# Patient Record
Sex: MALE | Race: WHITE | NOT HISPANIC OR LATINO | Employment: OTHER | URBAN - METROPOLITAN AREA
[De-identification: names, ages, dates, MRNs, and addresses within clinical notes are randomized per-mention and may not be internally consistent; named-entity substitution may affect disease eponyms.]

---

## 2017-04-24 ENCOUNTER — HOSPITAL ENCOUNTER (OUTPATIENT)
Dept: LAB | Facility: MEDICAL CENTER | Age: 57
End: 2017-04-24
Attending: FAMILY MEDICINE
Payer: COMMERCIAL

## 2017-04-24 LAB
ALBUMIN SERPL BCP-MCNC: 4.8 G/DL (ref 3.2–4.9)
ALBUMIN/GLOB SERPL: 1.9 G/DL
ALP SERPL-CCNC: 63 U/L (ref 30–99)
ALT SERPL-CCNC: 33 U/L (ref 2–50)
ANION GAP SERPL CALC-SCNC: 7 MMOL/L (ref 0–11.9)
AST SERPL-CCNC: 23 U/L (ref 12–45)
BASOPHILS # BLD AUTO: 0.8 % (ref 0–1.8)
BASOPHILS # BLD: 0.04 K/UL (ref 0–0.12)
BILIRUB SERPL-MCNC: 1.3 MG/DL (ref 0.1–1.5)
BUN SERPL-MCNC: 14 MG/DL (ref 8–22)
CALCIUM SERPL-MCNC: 9.4 MG/DL (ref 8.5–10.5)
CHLORIDE SERPL-SCNC: 100 MMOL/L (ref 96–112)
CHOLEST SERPL-MCNC: 186 MG/DL (ref 100–199)
CO2 SERPL-SCNC: 30 MMOL/L (ref 20–33)
CREAT SERPL-MCNC: 0.83 MG/DL (ref 0.5–1.4)
EOSINOPHIL # BLD AUTO: 0.08 K/UL (ref 0–0.51)
EOSINOPHIL NFR BLD: 1.6 % (ref 0–6.9)
ERYTHROCYTE [DISTWIDTH] IN BLOOD BY AUTOMATED COUNT: 42.5 FL (ref 35.9–50)
EST. AVERAGE GLUCOSE BLD GHB EST-MCNC: 108 MG/DL
GFR SERPL CREATININE-BSD FRML MDRD: >60 ML/MIN/1.73 M 2
GLOBULIN SER CALC-MCNC: 2.5 G/DL (ref 1.9–3.5)
GLUCOSE SERPL-MCNC: 97 MG/DL (ref 65–99)
HBA1C MFR BLD: 5.4 % (ref 0–5.6)
HCT VFR BLD AUTO: 48 % (ref 42–52)
HDLC SERPL-MCNC: 43 MG/DL
HGB BLD-MCNC: 16.4 G/DL (ref 14–18)
IMM GRANULOCYTES # BLD AUTO: 0.01 K/UL (ref 0–0.11)
IMM GRANULOCYTES NFR BLD AUTO: 0.2 % (ref 0–0.9)
LDLC SERPL CALC-MCNC: 124 MG/DL
LYMPHOCYTES # BLD AUTO: 1.2 K/UL (ref 1–4.8)
LYMPHOCYTES NFR BLD: 23.7 % (ref 22–41)
MCH RBC QN AUTO: 31.1 PG (ref 27–33)
MCHC RBC AUTO-ENTMCNC: 34.2 G/DL (ref 33.7–35.3)
MCV RBC AUTO: 90.9 FL (ref 81.4–97.8)
MONOCYTES # BLD AUTO: 0.43 K/UL (ref 0–0.85)
MONOCYTES NFR BLD AUTO: 8.5 % (ref 0–13.4)
NEUTROPHILS # BLD AUTO: 3.31 K/UL (ref 1.82–7.42)
NEUTROPHILS NFR BLD: 65.2 % (ref 44–72)
NRBC # BLD AUTO: 0 K/UL
NRBC BLD AUTO-RTO: 0 /100 WBC
PLATELET # BLD AUTO: 184 K/UL (ref 164–446)
PMV BLD AUTO: 10.5 FL (ref 9–12.9)
POTASSIUM SERPL-SCNC: 3.5 MMOL/L (ref 3.6–5.5)
PROT SERPL-MCNC: 7.3 G/DL (ref 6–8.2)
PSA SERPL-MCNC: 1.74 NG/ML (ref 0–4)
RBC # BLD AUTO: 5.28 M/UL (ref 4.7–6.1)
SODIUM SERPL-SCNC: 137 MMOL/L (ref 135–145)
TRIGL SERPL-MCNC: 97 MG/DL (ref 0–149)
WBC # BLD AUTO: 5.1 K/UL (ref 4.8–10.8)

## 2017-04-24 PROCEDURE — 83036 HEMOGLOBIN GLYCOSYLATED A1C: CPT

## 2017-04-24 PROCEDURE — 84153 ASSAY OF PSA TOTAL: CPT

## 2017-04-24 PROCEDURE — 80061 LIPID PANEL: CPT

## 2017-04-24 PROCEDURE — 36415 COLL VENOUS BLD VENIPUNCTURE: CPT

## 2017-04-24 PROCEDURE — 80053 COMPREHEN METABOLIC PANEL: CPT

## 2017-04-24 PROCEDURE — 85025 COMPLETE CBC W/AUTO DIFF WBC: CPT

## 2018-02-02 ENCOUNTER — OFFICE VISIT (OUTPATIENT)
Dept: MEDICAL GROUP | Facility: MEDICAL CENTER | Age: 58
End: 2018-02-02
Payer: COMMERCIAL

## 2018-02-02 VITALS
HEART RATE: 78 BPM | DIASTOLIC BLOOD PRESSURE: 82 MMHG | TEMPERATURE: 97 F | RESPIRATION RATE: 20 BRPM | HEIGHT: 71 IN | BODY MASS INDEX: 29.94 KG/M2 | WEIGHT: 213.85 LBS | SYSTOLIC BLOOD PRESSURE: 120 MMHG | OXYGEN SATURATION: 98 %

## 2018-02-02 DIAGNOSIS — Z72.89 OTHER PROBLEMS RELATED TO LIFESTYLE: ICD-10-CM

## 2018-02-02 DIAGNOSIS — I10 ESSENTIAL HYPERTENSION: ICD-10-CM

## 2018-02-02 DIAGNOSIS — E78.5 DYSLIPIDEMIA: ICD-10-CM

## 2018-02-02 DIAGNOSIS — Z13.6 SCREENING FOR ISCHEMIC HEART DISEASE: ICD-10-CM

## 2018-02-02 DIAGNOSIS — Z00.00 HEALTHCARE MAINTENANCE: ICD-10-CM

## 2018-02-02 DIAGNOSIS — H93.12 TINNITUS OF LEFT EAR: ICD-10-CM

## 2018-02-02 DIAGNOSIS — Z13.1 SCREENING FOR DIABETES MELLITUS: ICD-10-CM

## 2018-02-02 DIAGNOSIS — R93.89 ABNORMAL MRI: ICD-10-CM

## 2018-02-02 DIAGNOSIS — Z12.5 SCREENING FOR PROSTATE CANCER: ICD-10-CM

## 2018-02-02 DIAGNOSIS — Z12.11 SCREENING FOR COLON CANCER: ICD-10-CM

## 2018-02-02 DIAGNOSIS — J30.1 SEASONAL ALLERGIC RHINITIS DUE TO POLLEN, UNSPECIFIED CHRONICITY: ICD-10-CM

## 2018-02-02 DIAGNOSIS — Z23 NEED FOR VACCINATION: ICD-10-CM

## 2018-02-02 PROBLEM — J30.9 ALLERGIC RHINITIS: Status: ACTIVE | Noted: 2018-02-02

## 2018-02-02 PROCEDURE — 99204 OFFICE O/P NEW MOD 45 MIN: CPT | Performed by: FAMILY MEDICINE

## 2018-02-02 RX ORDER — LISINOPRIL AND HYDROCHLOROTHIAZIDE 12.5; 1 MG/1; MG/1
1 TABLET ORAL DAILY
COMMUNITY
End: 2018-10-18 | Stop reason: SDUPTHER

## 2018-02-02 RX ORDER — FLUTICASONE PROPIONATE 50 MCG
1 SPRAY, SUSPENSION (ML) NASAL DAILY
Qty: 1 BOTTLE | Refills: 12 | Status: SHIPPED | OUTPATIENT
Start: 2018-02-02 | End: 2018-06-19 | Stop reason: SDUPTHER

## 2018-02-02 ASSESSMENT — PATIENT HEALTH QUESTIONNAIRE - PHQ9: CLINICAL INTERPRETATION OF PHQ2 SCORE: 0

## 2018-02-02 NOTE — ASSESSMENT & PLAN NOTE
Patient takes Flonase for presumed allergic rhinitis. He wonders if the allergic rhinitis may be contributing to some dizziness issues and states that the Flonase does help.

## 2018-02-02 NOTE — ASSESSMENT & PLAN NOTE
The patient's blood pressure is well controlled with lisinopril/hydrochlorothiazide 10/12.5 mg daily.

## 2018-02-02 NOTE — PROGRESS NOTES
[facility]  Progress Note  New Patient    Subjective:   David Houston is a 57 y.o. male here today with a chief complaint of tinnitus. This is a new patient to me. The patient comes in alone.     Healthcare maintenance  Lipids: ordered.   Fasting Glucose: ordered.  Hepatitis C Screen: ordered.  Colonoscopy: ordered.  PHQ2: done 02/02/18 and normal.    Flu vaccine: patient will go to pharmacy to get this.   Tdap: given 2010.    Tinnitus of left ear  The patient describes mild ringing in his left ear which is not too bothersome to him. He states he has had a number of hearing tests in the past which were normal. He is not interested in a repeat hearing test.    Essential hypertension  The patient's blood pressure is well controlled with lisinopril/hydrochlorothiazide 10/12.5 mg daily.    Dyslipidemia  The patient's cholesterol is controlled with atorvastatin 40 mg daily.    Allergic rhinitis  Patient takes Flonase for presumed allergic rhinitis. He wonders if the allergic rhinitis may be contributing to some dizziness issues and states that the Flonase does help.    Abnormal MRI  In the past patient had some dizziness with ataxia. A MRI was ordered in 2014 showing a number of concerning changes. The patient was referred to Dr. Vivas (neurosurgery) but the patient states that he did not follow-up with him. The patient continues to have dizziness with ataxia, however, he thinks it is either stable or improved from previously. He is not interested in seeing a neurosurgeon currently, however, is willing to see a neurologist.      Current Outpatient Prescriptions on File Prior to Visit   Medication Sig Dispense Refill   • atorvastatin (LIPITOR) 40 MG TABS Take  by mouth. daily     • aspirin 81 MG tablet Take  by mouth. daily       No current facility-administered medications on file prior to visit.        Past Medical History:   Diagnosis Date   • Fall    • Hypercholesterolemia    • Hypertension   "      Allergies: Nkda [no known drug allergy]    Surgical History:  has a past surgical history that includes ankle orif (6/28/2010); other abdominal surgery (1970 ); and hardware removal ortho (1/26/2011).    Family History: family history includes Cancer in his father; No Known Problems in his mother.    Social History:  reports that he has quit smoking. He has never used smokeless tobacco. He reports that he drinks alcohol. He reports that he does not use drugs.    ROS:   Constitutional ROS: No unexplained fevers, sweats, or chills  Eye ROS: No eye pain, redness, discharge  Ear ROS: No ear pain  Mouth/Throat ROS: No sore throat  Neck ROS: No swollen glands  Pulmonary ROS: No shortness of breath  Cardiovascular ROS: No chest pain  Gastrointestinal ROS: No abdominal pain  Musculoskeletal/Extremities ROS: No peripheral edema  Hematologic/Lymphatic ROS: No abnormal bleeding  Skin/Integumentary ROS: No evidence of rash  Neurologic ROS: No seizures  Psychiatric ROS: No depression       Objective:     Vitals:    02/02/18 0828   BP: 120/82   Pulse: 78   Resp: 20   Temp: 36.1 °C (97 °F)   SpO2: 98%   Weight: 97 kg (213 lb 13.5 oz)   Height: 1.803 m (5' 11\")       Physical Exam:  Constitutional: Alert, no distress.  Skin: Warm, dry, good turgor, no rashes in visible areas.  Eye: Equal, round and reactive, conjunctiva clear, lids normal.  ENMT: Lips without lesions, good dentition, oropharynx clear.  Neck: Trachea midline, no masses, no thyromegaly. No cervical or supraclavicular lymphadenopathy  Respiratory: Unlabored respiratory effort, lungs clear to auscultation, no wheezes, no ronchi.  Cardiovascular: Normal S1, S2, no murmur, no edema.  Abdomen: Soft, non-tender, no masses, no hepatosplenomegaly.  Psych: Alert and oriented x3, normal affect and mood.        Assessment and Plan:     1. Screening for colon cancer  - REFERRAL TO GI FOR COLONOSCOPY    2. Essential hypertension  - Continue lisinopril/HCTZ 10/12.5 mg " daily.  - CMP.    3. Dyslipidemia  - Continue atorvastatin 40 mg daily.  - CMP.    4. Seasonal allergic rhinitis due to pollen, unspecified chronicity  - Continue Flonase.    5. Healthcare maintenance  - See history of present illness.    6. Abnormal MRI  See 2014 MRI. This is in the setting of dizziness and ataxia which is either stable or improved per patient report. I discussed with the patient my concern regarding his symptoms and this MRI finding and did recommend subspecialty input. The patient does not want to see a neurosurgeon but is willing to see a neurologist.  - neurology referral.     7. Tinnitus of left ear  Patient declines hearing test. Of note, some MRI findings discussed above.   - discussed sound masking, ensuring good sleep, decreasing stress and avoidance of tobacco and alcohol.    8. Other problems related to lifestyle  - HCV screening.    Note: Records requested from the [place] and Dr. Vivas.    Followup: Return in about 4 months (around 6/2/2018), or if symptoms worsen or fail to improve, for Wellness Visit, Long.

## 2018-02-02 NOTE — ASSESSMENT & PLAN NOTE
The patient describes mild ringing in his left ear which is not too bothersome to him. He states he has had a number of hearing tests in the past which were normal. He is not interested in a repeat hearing test.

## 2018-02-02 NOTE — ASSESSMENT & PLAN NOTE
In the past patient had some dizziness with ataxia. A MRI was ordered in 2014 showing a number of concerning changes. The patient was referred to Dr. Vivas (neurosurgery) but the patient states that he did not follow-up with him. The patient continues to have dizziness with ataxia, however, he thinks it is either stable or improved from previously. He is not interested in seeing a neurosurgeon currently, however, is willing to see a neurologist.

## 2018-02-02 NOTE — ASSESSMENT & PLAN NOTE
Lipids: ordered.   Fasting Glucose: ordered.  Hepatitis C Screen: ordered.  Colonoscopy: ordered.  PHQ2: done 02/02/18 and normal.    Flu vaccine: patient will go to pharmacy to get this.   Tdap: given 2010.

## 2018-02-02 NOTE — LETTER
[facility]  Vickey Watson M.D.  [address]  [place] [address]  Fax: [phone]   Authorization for Release/Disclosure of   Protected Health Information   Name: DAVID MURRAY : 1960 SSN: [id number]   Address: [address] [place] [address]  [place] [address] Phone:    [phone] (home)    I authorize the entity listed below to release/disclose the PHI below to:   [facility]/Vickey Watson M.D. and Vickey Watson M.D.   Provider or Entity Name:     Address   City, State, [facility]   Phone:      Fax:     Reason for request: continuity of care   Information to be released:    [  ] LAST COLONOSCOPY,  including any PATH REPORT and follow-up  [  ] LAST FIT/COLOGUARD RESULT [  ] LAST DEXA  [  ] LAST MAMMOGRAM  [  ] LAST PAP  [  ] LAST LABS [  ] RETINA EXAM REPORT  [  ] IMMUNIZATION RECORDS  [  ] Release all info      [  ] Check here and initial the line next to each item to release ALL health information INCLUDING  _____ Care and treatment for drug and / or alcohol abuse  _____ HIV testing, infection status, or AIDS  _____ Genetic Testing    DATES OF SERVICE OR TIME PERIOD TO BE DISCLOSED: _____________  I understand and acknowledge that:  * This Authorization may be revoked at any time by you in writing, except if your health information has already been used or disclosed.  * Your health information that will be used or disclosed as a result of you signing this authorization could be re-disclosed by the recipient. If this occurs, your re-disclosed health information may no longer be protected by State or Federal laws.  * You may refuse to sign this Authorization. Your refusal will not affect your ability to obtain treatment.  * This Authorization becomes effective upon signing and will  on (date) __________.      If no date is indicated, this Authorization will  one (1) year from the signature date.    Name: David Murray    Signature:   Date:     2018       PLEASE FAX  REQUESTED RECORDS BACK TO: [phone]

## 2018-03-15 ENCOUNTER — OFFICE VISIT (OUTPATIENT)
Dept: NEUROLOGY | Facility: MEDICAL CENTER | Age: 58
End: 2018-03-15
Payer: COMMERCIAL

## 2018-03-15 VITALS
DIASTOLIC BLOOD PRESSURE: 84 MMHG | WEIGHT: 208 LBS | SYSTOLIC BLOOD PRESSURE: 120 MMHG | BODY MASS INDEX: 29.12 KG/M2 | OXYGEN SATURATION: 95 % | HEIGHT: 71 IN | TEMPERATURE: 98.2 F | HEART RATE: 73 BPM

## 2018-03-15 DIAGNOSIS — D18.00 CAVERNOUS ANGIOMA: ICD-10-CM

## 2018-03-15 DIAGNOSIS — I10 HYPERTENSION, UNSPECIFIED TYPE: ICD-10-CM

## 2018-03-15 DIAGNOSIS — R93.89 ABNORMAL MRI: ICD-10-CM

## 2018-03-15 DIAGNOSIS — R27.0 ATAXIA: ICD-10-CM

## 2018-03-15 PROCEDURE — 99205 OFFICE O/P NEW HI 60 MIN: CPT | Performed by: PSYCHIATRY & NEUROLOGY

## 2018-03-15 NOTE — PROGRESS NOTES
CC: Balance Problems      HPI:    David Houston is a 57 y.o. male who presents today in initial neurologic consultation for an abnormal brain MRI and discoordination. He was referred by his primary care provider, Dr. Vickey Watson.     Mr. Houston tells me that 7-8 years ago, after a trip to [place], he first noticed symptoms of discoordination in his lower extremities. 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     He denies any dizziness or lightheadedness, but feels like he has to concentrate to walk. He has to hold onto a shopping cart when at the grocery store or stabilize himself on walls, or, for example, the bars on the treadmill at the gym. He also notes difficulty walking up and down stairs. He says that his symptoms have not changed in 7-8 years. There are no exacerbating or alleviating factors. He denies any upper extremity symptoms. His handwriting has also become more illegible. He also notes that his brother has the same kind of coordination problems, but he is not aware of anyone else in his family who does.      ROS:   Constitutional: No fevers or chills.  Eyes: No blurry vision or eye pain.  ENT: No dysphagia or hearing loss.  Respiratory: No cough or shortness of breath.  Cardiovascular: No chest pain or palpitations.  GI: No nausea, vomiting, or diarrhea.  : No urinary incontinence or dysuria.  Musculoskeletal: No joint swelling or arthralgias.  Skin: No skin rashes.  Neuro: No headaches, dizziness, or tremors.  Endocrine: No heat or cold intolerance. No polydipsia or polyuria.  Psych: No depression or anxiety.  Heme/Lymph: No easy bruising or swollen lymph nodes.  All other review of systems were reviewed and were negative.    Past Medical History:   Past Medical History:   Diagnosis Date   • Fall    • Hypercholesterolemia    • Hypertension        Past Surgical History:   Past Surgical History:   Procedure  Laterality Date   • HARDWARE REMOVAL ORTHO  1/26/2011    Performed by ROSALINE BENITEZ at SURGERY [facility]   • ANKLE ORIF  6/28/2010    Performed by ROSALINE BENITEZ at SURGERY [facility]   • OTHER ABDOMINAL SURGERY  1970     double hernia repair       Social History:   Social History     Social History   • Marital status: Single     Spouse name: N/A   • Number of children: N/A   • Years of education: N/A     Occupational History   • Not on file.     Social History Main Topics   • Smoking status: Former Smoker     Quit date: 3/15/1994   • Smokeless tobacco: Never Used   • Alcohol use Yes      Comment: 2 per day   • Drug use: No   • Sexual activity: Not on file     Other Topics Concern   • Not on file     Social History Narrative   • No narrative on file       Allergies:   Allergies   Allergen Reactions   • Nkda [No Known Drug Allergy]          Physical Exam:   Constitutional: Well-developed, well-nourished, good hygiene. Appears stated age.  Cardiovascular: RRR, with no murmurs, rubs or gallops. No carotid bruits. No peripheral edema, pedal pulses intact.   Respiratory: Lungs CTA B/L, no W/R/R.   Skin: Warm, dry, intact. No rashes observed.  Eyes:    Funduscopic: Optic discs flat with no evidence of papilledema or pallor. Normal posterior segments.  Neurologic:   Mental Status: Awake, alert, oriented x 3.   Speech: Fluent with normal prosody.   Memory: Able to recall 3 words at 1 minute and 5 minutes. Able to recall recent and remote events accurately.    Concentration: Attentive. Able to focus on history and follow multi-step commands.   Fund of Knowledge:    Cranial Nerves:   Sensory: Intact light touch, vibration and temperature.    Coordination: +ataxia of all four limbs. +dysdiadochokinesia. Unable to perform heel to shin smoothly.    DTR's: 2+ throughout without clonus.    Babinski: Toes downgoing bilaterally.   Romberg: Negative.   Movements: No resting tremors observed.   Musculoskeletal:     Strength: 5/5 in upper and lower extremities bilaterally.   Gait: Broad based and ataxic. Unable to perform tandem walking.   Tone: Normal bulk and tone.   Joints: No swelling.     Labs:  , HDL 43, Trig 97, Total Chol 186, hgb A1C 5.4, K 3.5, Cl 100, Na 137, AST 23, ALT 33, ALP 63, Platelets 184, MCV 90.9, WBC 5.1, Hgb 16.4, HCT 48.0.       Imaging:   Today I reviewed the patient's most recent MRI images with him in the examination room. I explained basic terminology of MRI's, verbalized my assessment, and answered his questions.     MRI of the brain w/wo contrast from   1.  16 mm x 10 mm x 9 mm cavernous angioma in the left parietal deep white matter at the left posterior ventricular angle. No evidence of acute or subacute hemorrhage. Surrounding hemosiderin deposition as expected.  2.  6 mm rounded lesion in the right side of the sella consistent with a pituitary cyst or microadenoma. If clinically indicated, thin section imaging of the sella could be pursued for further evaluation.  3.  Diminutive vertebrobasilar system associated with apparent carotid origin of both posterior cerebral arteries.  4.  Abnormal intermediate and increased T2 signal in the basilar artery consistent with slow flow or significant stenosis. Further evaluation with CTA or MRA may be of interest.    Assessment/Plan:  Ataxia  Mr. 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 On examination, he has pure motor ataxia in all four limbs, also evident in his gait pattern. The fact that his brother has similar problems, suggests a hereditary etiology, potentially a spinocerebellar ataxia. He did have evidence of low monse  However, Mr. Houston expresses that he does not have children or a wife, and he is not interested in any extensive workup unless  it can diagnose something that could be treated. I explained that his safety is my first priority. He is able to manage currently. I encouraged the added use of a cane for stability, but he is generally functioning very well despite his discoordination and ataxia.    Abnormal MRI  Abnormal white matter spot in right occipito-parietal area on prior MRI from 2014. Would like to repeat this to monitor for changes.     Plan:  1. Repeat brain MRI w/wo contrast    Cavernous angioma  Seen incidentally on brain MRI. Will order devoted MRA study to further evaluate vasculature.     Amanda Colon D.O., M.P.H  MS specialist.   Board Certified Neurologist.  Neurology Clerkship Director, [facility].    Neurology,  [facility].   Tel: [phone]  Fax: [phone]      Greater than 50% of this 60 minute face to face encounter was devoted to disease state counseling and coordination of care.  Please see above assessment and plan for discussion.

## 2018-03-15 NOTE — ASSESSMENT & PLAN NOTE
Abnormal white matter spot in right occipito-parietal area on prior MRI from 2014. Would like to repeat this to monitor for changes.     Plan:  1. Repeat brain MRI w/wo contrast

## 2018-03-15 NOTE — ASSESSMENT & PLAN NOTE
Mr. 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 On examination, he has pure motor ataxia in all four limbs, also evident in his gait pattern. The fact that his brother has similar problems, suggests a hereditary etiology, potentially a spinocerebellar ataxia. He did have evidence of low monse  However, Mr. Houston expresses that he does not have children or a wife, and he is not interested in any extensive workup unless it can diagnose something that could be treated. I explained that his safety is my first priority. He is able to manage currently. I encouraged the added use of a cane for stability, but he is generally functioning very well despite his discoordination and ataxia.

## 2018-04-23 ENCOUNTER — HOSPITAL ENCOUNTER (OUTPATIENT)
Dept: LAB | Facility: MEDICAL CENTER | Age: 58
End: 2018-04-23
Attending: FAMILY MEDICINE
Payer: COMMERCIAL

## 2018-04-23 DIAGNOSIS — Z12.5 SCREENING FOR PROSTATE CANCER: ICD-10-CM

## 2018-04-23 DIAGNOSIS — Z13.1 SCREENING FOR DIABETES MELLITUS: ICD-10-CM

## 2018-04-23 DIAGNOSIS — Z72.89 OTHER PROBLEMS RELATED TO LIFESTYLE: ICD-10-CM

## 2018-04-23 DIAGNOSIS — Z13.6 SCREENING FOR ISCHEMIC HEART DISEASE: ICD-10-CM

## 2018-04-23 LAB
ALBUMIN SERPL BCP-MCNC: 4.8 G/DL (ref 3.2–4.9)
ALBUMIN/GLOB SERPL: 1.8 G/DL
ALP SERPL-CCNC: 72 U/L (ref 30–99)
ALT SERPL-CCNC: 29 U/L (ref 2–50)
ANION GAP SERPL CALC-SCNC: 8 MMOL/L (ref 0–11.9)
AST SERPL-CCNC: 23 U/L (ref 12–45)
BILIRUB SERPL-MCNC: 1.7 MG/DL (ref 0.1–1.5)
BUN SERPL-MCNC: 11 MG/DL (ref 8–22)
CALCIUM SERPL-MCNC: 9.9 MG/DL (ref 8.5–10.5)
CHLORIDE SERPL-SCNC: 98 MMOL/L (ref 96–112)
CHOLEST SERPL-MCNC: 185 MG/DL (ref 100–199)
CO2 SERPL-SCNC: 30 MMOL/L (ref 20–33)
CREAT SERPL-MCNC: 0.84 MG/DL (ref 0.5–1.4)
GLOBULIN SER CALC-MCNC: 2.7 G/DL (ref 1.9–3.5)
GLUCOSE SERPL-MCNC: 101 MG/DL (ref 65–99)
HCV AB SER QL: NEGATIVE
HDLC SERPL-MCNC: 52 MG/DL
LDLC SERPL CALC-MCNC: 108 MG/DL
POTASSIUM SERPL-SCNC: 4.2 MMOL/L (ref 3.6–5.5)
PROT SERPL-MCNC: 7.5 G/DL (ref 6–8.2)
PSA SERPL-MCNC: 0.8 NG/ML (ref 0–4)
SODIUM SERPL-SCNC: 136 MMOL/L (ref 135–145)
TRIGL SERPL-MCNC: 125 MG/DL (ref 0–149)

## 2018-04-23 PROCEDURE — 36415 COLL VENOUS BLD VENIPUNCTURE: CPT

## 2018-04-23 PROCEDURE — 86803 HEPATITIS C AB TEST: CPT

## 2018-04-23 PROCEDURE — 80061 LIPID PANEL: CPT

## 2018-04-23 PROCEDURE — 84153 ASSAY OF PSA TOTAL: CPT

## 2018-04-23 PROCEDURE — 80053 COMPREHEN METABOLIC PANEL: CPT

## 2018-05-17 ENCOUNTER — HOSPITAL ENCOUNTER (OUTPATIENT)
Dept: RADIOLOGY | Facility: MEDICAL CENTER | Age: 58
End: 2018-05-17
Attending: PSYCHIATRY & NEUROLOGY
Payer: COMMERCIAL

## 2018-05-17 DIAGNOSIS — R27.0 ATAXIA: ICD-10-CM

## 2018-05-17 PROCEDURE — 70553 MRI BRAIN STEM W/O & W/DYE: CPT

## 2018-05-17 PROCEDURE — 70544 MR ANGIOGRAPHY HEAD W/O DYE: CPT

## 2018-05-17 PROCEDURE — 700117 HCHG RX CONTRAST REV CODE 255: Performed by: PSYCHIATRY & NEUROLOGY

## 2018-05-17 PROCEDURE — A9579 GAD-BASE MR CONTRAST NOS,1ML: HCPCS | Performed by: PSYCHIATRY & NEUROLOGY

## 2018-05-17 RX ADMIN — GADODIAMIDE 20 ML: 287 INJECTION INTRAVENOUS at 09:10

## 2018-05-24 ENCOUNTER — OFFICE VISIT (OUTPATIENT)
Dept: NEUROLOGY | Facility: MEDICAL CENTER | Age: 58
End: 2018-05-24
Payer: COMMERCIAL

## 2018-05-24 VITALS
DIASTOLIC BLOOD PRESSURE: 82 MMHG | HEART RATE: 69 BPM | WEIGHT: 210 LBS | SYSTOLIC BLOOD PRESSURE: 116 MMHG | BODY MASS INDEX: 29.4 KG/M2 | OXYGEN SATURATION: 97 % | TEMPERATURE: 98.1 F | HEIGHT: 71 IN

## 2018-05-24 DIAGNOSIS — R27.0 ATAXIA: ICD-10-CM

## 2018-05-24 DIAGNOSIS — R26.89 BALANCE DISORDER: ICD-10-CM

## 2018-05-24 DIAGNOSIS — R93.89 ABNORMAL MRI: ICD-10-CM

## 2018-05-24 DIAGNOSIS — D18.00 CAVERNOUS ANGIOMA: ICD-10-CM

## 2018-05-24 PROCEDURE — 99214 OFFICE O/P EST MOD 30 MIN: CPT | Performed by: PSYCHIATRY & NEUROLOGY

## 2018-05-24 NOTE — ASSESSMENT & PLAN NOTE
Mr. Huoston is a 57-year-old male with a past medical history of hypertension, hyperlipidemia, unruptured brain cavernous angioma, asymptomatic pituitary cyst, who presents with ataxia. We discussed sending him to physical therapy to help improve his balance and coordination.     Plan:  1. Referral placed to Physical Therapy.

## 2018-05-24 NOTE — ASSESSMENT & PLAN NOTE
The abnormal white matter changes in the bilateral parietal/occipital lobes R>L appear unchanged from his last MRI in 2014. While the MRI was read as normal, the cerebellar vermis in particular appears prematurely atrophic with a large fourth ventricle. I explained to the patient that while some acquired conditions can cause this, such as alcohol abuse, a genetic condition such as spinal cerebellar ataxia may be the cause. While there are no cures for these disorders, I discussed with him that an accurate diagnosis would provide him with an accurate treatment and there may be clinical trials for such conditions. We decided to treat conservatively with physical therapy first and meet again in 3-4 months to discuss whether to send him to a referral center for such conditions such as [facility] or [facility].

## 2018-05-24 NOTE — PROGRESS NOTES
CC: Ataxia and Abnormal Brain MRI      HPI:    David Houston is a 57 y.o. male who presents today in neurologic follow up for an abnormal brain MRI and gait ataxia x 11 years. He was last seen on 3/15/18 at which time his brain MRI was reviewed and he was noted to have a  Left parietal periventricular cavernous angioma and diminutive vertebrobasilar system. His brain MRI also showed an abnormal white matter spot in his right parietal-occipital area in 2014. Since that visit, he has undergone a repeat brain MRI and MRA of the head.     Mr. Houston tells me that he has still been experiencing gait imbalance, denies any recent falls. He feels as though he has figured out ways to compensate for the gait imbalance, and utilizes his upper body strength to help support him and balance himself.           ROS:   Comprehensive review of systems was performed and was negative except for that reported in the HPI.      Past Medical History:   Past Medical History:   Diagnosis Date   • Fall    • Hypercholesterolemia    • Hypertension        Past Surgical History:   Past Surgical History:   Procedure Laterality Date   • HARDWARE REMOVAL ORTHO  1/26/2011    Performed by ROSALINE BENITEZ at SURGERY [facility]   • ANKLE ORIF  6/28/2010    Performed by ROSALINE BENITEZ at SURGERY [facility]   • OTHER ABDOMINAL SURGERY  1970     double hernia repair       Social History:   Social History     Social History   • Marital status: Single     Spouse name: N/A   • Number of children: N/A   • Years of education: N/A     Occupational History   • Not on file.     Social History Main Topics   • Smoking status: Former Smoker     Quit date: 3/15/1994   • Smokeless tobacco: Never Used   • Alcohol use Yes      Comment: 2 per day   • Drug use: No   • Sexual activity: Not on file     Other Topics Concern   • Not on file     Social History Narrative   • No narrative on file       Allergies:   Allergies   Allergen Reactions   • Nkda [No  "Known Drug Allergy]      Encounter Vitals  Standard Vitals  Vitals  Blood Pressure: 116/82  Temperature: 36.7 °C (98.1 °F)  Pulse: 69  Pulse Oximetry: 97 %  Height: 180.3 cm (5' 11\")  Weight: 95.3 kg (210 lb)  Encounter Vitals  Temperature: 36.7 °C (98.1 °F)  Temp Source: Temporal  Blood Pressure: 116/82  BP Location: Upper Arm  Patient BP Position: Sitting  Pulse: 69  Pulse Oximetry: 97 %  Weight: 95.3 kg (210 lb)  Weight Source: Stand Up Scale  Height: 180.3 cm (5' 11\")  BMI (Calculated): 29.29    Physical Exam:   Constitutional: Well-developed, well-nourished, good hygiene. Appears stated age.  Cardiovascular: RRR, with no murmurs, rubs or gallops. No carotid bruits.   Respiratory: Lungs CTA B/L, no W/R/R.   Abdomen: Soft Non-tender to Palpation. Non-distended.  Extremities: No peripheral edema, pedal pulses intact.   Skin: Warm, dry, intact. No rashes observed.  Eyes: Sclera anicteric  Neurologic:   Mental Status: Awake, alert, oriented x 3.   Speech: Fluent with normal prosody.   Memory: Able to recall 3 words at 1 minute and 5 minutes. Able to recall recent and remote events accurately.    Concentration: Attentive. Able to focus on history and follow multi-step commands.              Fund of Knowledge: Appropriate   Cranial Nerves:    CN II: Left pupil larger than the right.    CN III, IV, VI: Good eye closure, EOMI. No nystagmus.    CN V: Facial sensation intact and symmetric.     CN VII: No facial asymmetry.     CN VIII: Hearing intact.     CN IX and X: Palate elevates symmetrically. Normal gag reflex.    CN XI: Symmetric shoulder shrug.     CN XII: Tongue midline.    Sensory: Intact light touch, vibration and temperature.    Coordination: + dysdiadochokinesia. Bilateral ataxia with heel to shin.        DTR's: 2+ throughout without clonus.    Babinski: Toes downgoing bilaterally.   Romberg: Negative.   Movements: No tremors observed.   Musculoskeletal:    Strength: 5/5 in upper and lower extremities " bilaterally.   Gait: Broad based and ataxic.   Tone: Normal bulk and tone.   Joints: No swelling.     Imaging:   Today we reviewed Mr. Houston recent MRI imaging in the examination room during which I directly compared to his most recent MRI scan and provided my own verbal assessment.    MRI of the brain with and without contrast from May 17, 2018  Radiologist's impression:  1.  No evidence of acute territorial infarct, intracranial hemorrhage or mass lesion.  2.  1.1 cm cavernous angioma in the left peritrigonal white matter.    MRA of the head without contrast from May 17, 2018  MRA of the [place] within normal limits.    Assessment/Plan:  Ataxia  Mr. Houston is a 57-year-old male with a past medical history of hypertension, hyperlipidemia, unruptured brain cavernous angioma, asymptomatic pituitary cyst, who presents with ataxia. We discussed sending him to physical therapy to help improve his balance and coordination.     Plan:  1. Referral placed to Physical Therapy.    Abnormal MRI  The abnormal white matter changes in the bilateral parietal/occipital lobes R>L appear unchanged from his last MRI in 2014. While the MRI was read as normal, the cerebellar vermis in particular appears prematurely atrophic with a large fourth ventricle. I explained to the patient that while some acquired conditions can cause this, such as alcohol abuse, a genetic condition such as spinal cerebellar ataxia may be the cause. While there are no cures for these disorders, I discussed with him that an accurate diagnosis would provide him with an accurate treatment and there may be clinical trials for such conditions. We decided to treat conservatively with physical therapy first and meet again in 3-4 months to discuss whether to send him to a referral center for such conditions such as [facility] or [facility].        Cavernous angioma  No abnormalities on his brain MRA. The cavernous angioma was unchanged from prior. I discussed with the  patient that while these can sometimes bleed, we do not typically do interventions on lesions of this type, size, and location.       Amanda Colon D.O., M.P.H  MS specialist.   Board Certified Neurologist.  Neurology Clerkship Director, [facility].    Neurology,  [facility].   Tel: [phone]  Fax: [phone]

## 2018-06-19 ENCOUNTER — OFFICE VISIT (OUTPATIENT)
Dept: MEDICAL GROUP | Facility: MEDICAL CENTER | Age: 58
End: 2018-06-19
Payer: COMMERCIAL

## 2018-06-19 VITALS
BODY MASS INDEX: 29.38 KG/M2 | TEMPERATURE: 97.9 F | HEIGHT: 71 IN | OXYGEN SATURATION: 95 % | RESPIRATION RATE: 20 BRPM | WEIGHT: 209.88 LBS | DIASTOLIC BLOOD PRESSURE: 80 MMHG | SYSTOLIC BLOOD PRESSURE: 118 MMHG | HEART RATE: 62 BPM

## 2018-06-19 DIAGNOSIS — L98.9 SKIN LESION: ICD-10-CM

## 2018-06-19 DIAGNOSIS — I10 ESSENTIAL HYPERTENSION: ICD-10-CM

## 2018-06-19 DIAGNOSIS — E80.6 HYPERBILIRUBINEMIA: ICD-10-CM

## 2018-06-19 DIAGNOSIS — E78.5 DYSLIPIDEMIA: ICD-10-CM

## 2018-06-19 DIAGNOSIS — Z00.00 HEALTHCARE MAINTENANCE: ICD-10-CM

## 2018-06-19 DIAGNOSIS — J30.1 SEASONAL ALLERGIC RHINITIS DUE TO POLLEN: ICD-10-CM

## 2018-06-19 PROCEDURE — 99214 OFFICE O/P EST MOD 30 MIN: CPT | Mod: 25 | Performed by: FAMILY MEDICINE

## 2018-06-19 PROCEDURE — 99396 PREV VISIT EST AGE 40-64: CPT | Performed by: FAMILY MEDICINE

## 2018-06-19 RX ORDER — FLUTICASONE PROPIONATE 50 MCG
1-2 SPRAY, SUSPENSION (ML) NASAL DAILY
Qty: 3 BOTTLE | Refills: 12 | Status: SHIPPED | OUTPATIENT
Start: 2018-06-19 | End: 2019-07-23 | Stop reason: SDUPTHER

## 2018-06-19 NOTE — ASSESSMENT & PLAN NOTE
The patient takes Flonase for allergic rhinitis. He tolerates this medication well. The last time the prescription was sent in, it only permitted one spray per day. The patient states he takes between one and 2 sprays per day.

## 2018-06-19 NOTE — ASSESSMENT & PLAN NOTE
Lipids: controlled with atorvastatin.   Fasting Glucose: slightly elevated.   Hepatitis C Screen: done 2018 and normal.   Colonoscopy: records requested.  PHQ2: done 02/02/18 and normal.    Tdap: given 2010.

## 2018-06-19 NOTE — PROGRESS NOTES
[facility]  Progress Note  Established Patient    Subjective:   David Houston is a 57 y.o. male here today with a chief complaint of skin lesion and here for a wellness exam. The patient is alone.     Healthcare maintenance  Lipids: controlled with atorvastatin.   Fasting Glucose: slightly elevated.   Hepatitis C Screen: done 2018 and normal.   Colonoscopy: records requested.  PHQ2: done 02/02/18 and normal.    Tdap: given 2010.    Hyperbilirubinemia  The patient has stable hyperbilirubinemia.    Essential hypertension  Patient's blood pressure is at goal on lisinopril/hydrochlorothiazide 10/12.5 mg daily. The patient denies dizziness or lightheadedness.    Dyslipidemia  The patient's cholesterol is controlled with atorvastatin 40 mg daily.    Allergic rhinitis  The patient takes Flonase for allergic rhinitis. He tolerates this medication well. The last time the prescription was sent in, it only permitted one spray per day. The patient states he takes between one and 2 sprays per day.    Skin lesion  The patient describes a forehead skin lesion. The patient states he used to flake but no longer flakes. It is not really bothersome to him. It has not changed in size or quality over the past several months.      Current Outpatient Prescriptions on File Prior to Visit   Medication Sig Dispense Refill   • lisinopril-hydrochlorothiazide (PRINZIDE, ZESTORETIC) 10-12.5 MG per tablet Take 1 Tab by mouth every day.     • atorvastatin (LIPITOR) 40 MG TABS Take  by mouth. daily     • aspirin 81 MG tablet Take  by mouth. daily       No current facility-administered medications on file prior to visit.        Past Medical History:   Diagnosis Date   • Fall    • Hypercholesterolemia    • Hypertension        Allergies: Nkda [no known drug allergy]    Surgical History:  has a past surgical history that includes ankle orif (6/28/2010); other abdominal surgery (1970 ); and hardware removal ortho (1/26/2011).    Family  "History: family history includes Cancer in his father; Dementia in his father; No Known Problems in his mother.    Social History:  reports that he quit smoking about 24 years ago. He has never used smokeless tobacco. He reports that he drinks alcohol. He reports that he does not use drugs.    ROS: no fever or nausea.        Objective:     Vitals:    06/19/18 1000   BP: 118/80   Pulse: 62   Resp: 20   Temp: 36.6 °C (97.9 °F)   SpO2: 95%   Weight: 95.2 kg (209 lb 14.1 oz)   Height: 1.803 m (5' 11\")       Physical Exam:  General: alert in no apparent distress.   Cardio: regular rate and rhythm, no murmurs, rubs or gallops.   Resp: CTAB no w/r/r.   Skin: forehead lesions measures approximately 0.4 cm with central plug and deeper well-circumscribed mass without fluctuance, crepitus, redness or warmth.         Assessment and Plan:     1. Hyperbilirubinemia  Suspect Gilbert's. Labs below before f/u.   - COMP METABOLIC PANEL; Future  - BILIRUBIN DIRECT; Future  - CBC WITH DIFFERENTIAL; Future  - LDH; Future  - RETICULOCYTES COUNT; Future    2. Essential hypertension  This is an established and stable problem.  - continue current regimen.   - COMP METABOLIC PANEL; Future    3. Dyslipidemia  This is an established and stable problem.  - continue current regimen.   - COMP METABOLIC PANEL; Future    4. Healthcare maintenance  - see HPI.     5. Seasonal allergic rhinitis due to pollen  - fluticasone (FLONASE) 50 MCG/ACT nasal spray; Spray 1-2 Sprays in nose every day.  Dispense: 3 Bottle; Refill: 12    6. Skin lesion  Suspect sebaceous cyst. Offered removal or dermatology referral. Patient would like to monitor.   - call with any new or worsening symptoms or changes.         Followup: Return in about 9 months (around 3/19/2019), or if symptoms worsen or fail to improve.         "

## 2018-06-19 NOTE — LETTER
[facility]  Vickey Watson M.D.  [address]  [place] [address]  Fax: [phone]   Authorization for Release/Disclosure of   Protected Health Information   Name: DAVID MURRAY : 1960 SSN: [id number]   Address: [address] [place] [address]  [place] [address] Phone:    [phone] (home)    I authorize the entity listed below to release/disclose the PHI below to:   [facility]/Vickey Watson M.D. and Vickey Watson M.D.   Provider or Entity Name:     Address   City, State, [facility]   Phone:      Fax:     Reason for request: continuity of care   Information to be released:    [  ] LAST COLONOSCOPY,  including any PATH REPORT and follow-up  [  ] LAST FIT/COLOGUARD RESULT [  ] LAST DEXA  [  ] LAST MAMMOGRAM  [  ] LAST PAP  [  ] LAST LABS [  ] RETINA EXAM REPORT  [  ] IMMUNIZATION RECORDS  [  ] Release all info      [  ] Check here and initial the line next to each item to release ALL health information INCLUDING  _____ Care and treatment for drug and / or alcohol abuse  _____ HIV testing, infection status, or AIDS  _____ Genetic Testing    DATES OF SERVICE OR TIME PERIOD TO BE DISCLOSED: _____________  I understand and acknowledge that:  * This Authorization may be revoked at any time by you in writing, except if your health information has already been used or disclosed.  * Your health information that will be used or disclosed as a result of you signing this authorization could be re-disclosed by the recipient. If this occurs, your re-disclosed health information may no longer be protected by State or Federal laws.  * You may refuse to sign this Authorization. Your refusal will not affect your ability to obtain treatment.  * This Authorization becomes effective upon signing and will  on (date) __________.      If no date is indicated, this Authorization will  one (1) year from the signature date.    Name: David Murray    Signature:   Date:     2018       PLEASE FAX  REQUESTED RECORDS BACK TO: [phone]

## 2018-06-19 NOTE — ASSESSMENT & PLAN NOTE
Patient's blood pressure is at goal on lisinopril/hydrochlorothiazide 10/12.5 mg daily. The patient denies dizziness or lightheadedness.

## 2018-06-19 NOTE — ASSESSMENT & PLAN NOTE
The patient describes a forehead skin lesion. The patient states he used to flake but no longer flakes. It is not really bothersome to him. It has not changed in size or quality over the past several months.

## 2018-10-18 ENCOUNTER — OFFICE VISIT (OUTPATIENT)
Dept: MEDICAL GROUP | Facility: MEDICAL CENTER | Age: 58
End: 2018-10-18
Payer: COMMERCIAL

## 2018-10-18 VITALS
OXYGEN SATURATION: 97 % | SYSTOLIC BLOOD PRESSURE: 110 MMHG | HEIGHT: 71 IN | BODY MASS INDEX: 29.01 KG/M2 | DIASTOLIC BLOOD PRESSURE: 68 MMHG | WEIGHT: 207.2 LBS | TEMPERATURE: 97.6 F | HEART RATE: 60 BPM

## 2018-10-18 DIAGNOSIS — Z23 NEED FOR VACCINATION: ICD-10-CM

## 2018-10-18 DIAGNOSIS — E80.6 HYPERBILIRUBINEMIA: ICD-10-CM

## 2018-10-18 DIAGNOSIS — I10 ESSENTIAL HYPERTENSION: ICD-10-CM

## 2018-10-18 DIAGNOSIS — M25.532 LEFT WRIST PAIN: ICD-10-CM

## 2018-10-18 PROCEDURE — 90471 IMMUNIZATION ADMIN: CPT | Performed by: FAMILY MEDICINE

## 2018-10-18 PROCEDURE — 90686 IIV4 VACC NO PRSV 0.5 ML IM: CPT | Performed by: FAMILY MEDICINE

## 2018-10-18 PROCEDURE — 99214 OFFICE O/P EST MOD 30 MIN: CPT | Mod: 25 | Performed by: FAMILY MEDICINE

## 2018-10-18 RX ORDER — LISINOPRIL AND HYDROCHLOROTHIAZIDE 12.5; 1 MG/1; MG/1
1 TABLET ORAL DAILY
Qty: 90 TAB | Refills: 4 | Status: SHIPPED | OUTPATIENT
Start: 2018-10-18 | End: 2019-06-18

## 2018-10-18 RX ORDER — NAPROXEN 375 MG/1
375 TABLET ORAL 2 TIMES DAILY WITH MEALS
Qty: 14 TAB | Refills: 0 | Status: SHIPPED | OUTPATIENT
Start: 2018-10-18 | End: 2018-10-22 | Stop reason: SDUPTHER

## 2018-10-18 NOTE — ASSESSMENT & PLAN NOTE
The patient's blood pressure is under excellent control on his current medication regimen. He denies any new concerns of lightheadedness.

## 2018-10-18 NOTE — ASSESSMENT & PLAN NOTE
The patient describes a 6 week history of left wrist pain and swelling. There is no associated trauma. His symptoms are mild to moderate in severity. Flexion exacerbates his symptoms. Splinting at night seems to help.

## 2018-10-19 ENCOUNTER — HOSPITAL ENCOUNTER (OUTPATIENT)
Dept: RADIOLOGY | Facility: MEDICAL CENTER | Age: 58
End: 2018-10-19
Attending: FAMILY MEDICINE
Payer: COMMERCIAL

## 2018-10-19 ENCOUNTER — HOSPITAL ENCOUNTER (OUTPATIENT)
Dept: LAB | Facility: MEDICAL CENTER | Age: 58
End: 2018-10-19
Attending: FAMILY MEDICINE
Payer: COMMERCIAL

## 2018-10-19 DIAGNOSIS — I10 ESSENTIAL HYPERTENSION: ICD-10-CM

## 2018-10-19 DIAGNOSIS — M25.532 LEFT WRIST PAIN: ICD-10-CM

## 2018-10-19 DIAGNOSIS — E80.6 HYPERBILIRUBINEMIA: ICD-10-CM

## 2018-10-19 DIAGNOSIS — E78.5 DYSLIPIDEMIA: ICD-10-CM

## 2018-10-19 LAB
ALBUMIN SERPL BCP-MCNC: 5 G/DL (ref 3.2–4.9)
ALBUMIN/GLOB SERPL: 2 G/DL
ALP SERPL-CCNC: 88 U/L (ref 30–99)
ALT SERPL-CCNC: 23 U/L (ref 2–50)
ANION GAP SERPL CALC-SCNC: 7 MMOL/L (ref 0–11.9)
AST SERPL-CCNC: 19 U/L (ref 12–45)
BASOPHILS # BLD AUTO: 0.8 % (ref 0–1.8)
BASOPHILS # BLD: 0.05 K/UL (ref 0–0.12)
BILIRUB CONJ SERPL-MCNC: 0.3 MG/DL (ref 0.1–0.5)
BILIRUB INDIRECT SERPL-MCNC: 0.9 MG/DL (ref 0–1)
BILIRUB SERPL-MCNC: 1.2 MG/DL (ref 0.1–1.5)
BUN SERPL-MCNC: 13 MG/DL (ref 8–22)
CALCIUM SERPL-MCNC: 10 MG/DL (ref 8.5–10.5)
CHLORIDE SERPL-SCNC: 99 MMOL/L (ref 96–112)
CO2 SERPL-SCNC: 32 MMOL/L (ref 20–33)
CREAT SERPL-MCNC: 0.74 MG/DL (ref 0.5–1.4)
CRP SERPL HS-MCNC: 0.37 MG/DL (ref 0–0.75)
EOSINOPHIL # BLD AUTO: 0.08 K/UL (ref 0–0.51)
EOSINOPHIL NFR BLD: 1.3 % (ref 0–6.9)
ERYTHROCYTE [DISTWIDTH] IN BLOOD BY AUTOMATED COUNT: 40.3 FL (ref 35.9–50)
ERYTHROCYTE [SEDIMENTATION RATE] IN BLOOD BY WESTERGREN METHOD: 10 MM/HOUR (ref 0–20)
FASTING STATUS PATIENT QL REPORTED: NORMAL
GLOBULIN SER CALC-MCNC: 2.5 G/DL (ref 1.9–3.5)
GLUCOSE SERPL-MCNC: 95 MG/DL (ref 65–99)
HCT VFR BLD AUTO: 48.1 % (ref 42–52)
HGB BLD-MCNC: 16.2 G/DL (ref 14–18)
HGB RETIC QN AUTO: 33.5 PG/CELL (ref 29–35)
IMM GRANULOCYTES # BLD AUTO: 0.02 K/UL (ref 0–0.11)
IMM GRANULOCYTES NFR BLD AUTO: 0.3 % (ref 0–0.9)
IMM RETICS NFR: 6.2 % (ref 9.3–17.4)
LDH SERPL L TO P-CCNC: 166 U/L (ref 107–266)
LYMPHOCYTES # BLD AUTO: 1.13 K/UL (ref 1–4.8)
LYMPHOCYTES NFR BLD: 18.3 % (ref 22–41)
MCH RBC QN AUTO: 29.9 PG (ref 27–33)
MCHC RBC AUTO-ENTMCNC: 33.7 G/DL (ref 33.7–35.3)
MCV RBC AUTO: 88.7 FL (ref 81.4–97.8)
MONOCYTES # BLD AUTO: 0.54 K/UL (ref 0–0.85)
MONOCYTES NFR BLD AUTO: 8.7 % (ref 0–13.4)
NEUTROPHILS # BLD AUTO: 4.36 K/UL (ref 1.82–7.42)
NEUTROPHILS NFR BLD: 70.6 % (ref 44–72)
NRBC # BLD AUTO: 0 K/UL
NRBC BLD-RTO: 0 /100 WBC
PLATELET # BLD AUTO: 205 K/UL (ref 164–446)
PMV BLD AUTO: 10.1 FL (ref 9–12.9)
POTASSIUM SERPL-SCNC: 3.7 MMOL/L (ref 3.6–5.5)
PROT SERPL-MCNC: 7.5 G/DL (ref 6–8.2)
RBC # BLD AUTO: 5.42 M/UL (ref 4.7–6.1)
RETICS # AUTO: 0.11 M/UL (ref 0.04–0.06)
RETICS/RBC NFR: 1.9 % (ref 0.8–2.1)
SODIUM SERPL-SCNC: 138 MMOL/L (ref 135–145)
URATE SERPL-MCNC: 7.1 MG/DL (ref 2.5–8.3)
WBC # BLD AUTO: 6.2 K/UL (ref 4.8–10.8)

## 2018-10-19 PROCEDURE — 85652 RBC SED RATE AUTOMATED: CPT

## 2018-10-19 PROCEDURE — 80053 COMPREHEN METABOLIC PANEL: CPT

## 2018-10-19 PROCEDURE — 85025 COMPLETE CBC W/AUTO DIFF WBC: CPT

## 2018-10-19 PROCEDURE — 73110 X-RAY EXAM OF WRIST: CPT | Mod: LT

## 2018-10-19 PROCEDURE — 36415 COLL VENOUS BLD VENIPUNCTURE: CPT

## 2018-10-19 PROCEDURE — 85046 RETICYTE/HGB CONCENTRATE: CPT

## 2018-10-19 PROCEDURE — 83615 LACTATE (LD) (LDH) ENZYME: CPT

## 2018-10-19 PROCEDURE — 86140 C-REACTIVE PROTEIN: CPT

## 2018-10-19 PROCEDURE — 84550 ASSAY OF BLOOD/URIC ACID: CPT

## 2018-10-19 PROCEDURE — 82248 BILIRUBIN DIRECT: CPT

## 2018-10-22 DIAGNOSIS — M25.532 LEFT WRIST PAIN: ICD-10-CM

## 2018-10-22 RX ORDER — NAPROXEN 375 MG/1
TABLET ORAL
Qty: 14 TAB | Refills: 0 | Status: SHIPPED | OUTPATIENT
Start: 2018-10-22 | End: 2018-10-27 | Stop reason: SDUPTHER

## 2018-11-15 ENCOUNTER — OFFICE VISIT (OUTPATIENT)
Dept: MEDICAL GROUP | Facility: CLINIC | Age: 58
End: 2018-11-15
Payer: COMMERCIAL

## 2018-11-15 VITALS
SYSTOLIC BLOOD PRESSURE: 118 MMHG | WEIGHT: 207.2 LBS | HEIGHT: 71 IN | HEART RATE: 75 BPM | DIASTOLIC BLOOD PRESSURE: 78 MMHG | BODY MASS INDEX: 29.01 KG/M2 | TEMPERATURE: 97.9 F | OXYGEN SATURATION: 97 % | RESPIRATION RATE: 16 BRPM

## 2018-11-15 DIAGNOSIS — M19.032 LOCALIZED PRIMARY OSTEOARTHRITIS OF WRIST, LEFT: ICD-10-CM

## 2018-11-15 PROCEDURE — 99203 OFFICE O/P NEW LOW 30 MIN: CPT | Performed by: FAMILY MEDICINE

## 2018-11-15 ASSESSMENT — ENCOUNTER SYMPTOMS
SHORTNESS OF BREATH: 0
DIZZINESS: 0
CHILLS: 0
FEVER: 0
NAUSEA: 0
VOMITING: 0
HEADACHES: 0

## 2018-11-15 NOTE — PROGRESS NOTES
Subjective:      David Houston is a 58 y.o. male who presents with Wrist Pain (Referral from / L wrist pain )     Referred by Vickey Watson M.D.  for evaluation of LEFT wrist pain (right-handed dominant)    HPI   LEFT wrist pain (right-handed dominant)  Insidious onset without injury  Symptoms began approximately 2 and half months ago (mid to late September 2018)  Constant, achy  Mostly along the dorsal/radial last back to the LEFT wrist with some occasional radiation into the LEFT base of the thumb all the way up the mid-forearm  Seen at primary care office, had x-rays  Taking naproxen for pain which is helping some  Has tried thumb spica splint which helps some  POSITIVE night symptoms on occasion, uses thumb spica splint which helps  Denies any prior issues or injuries to the LEFT wrist    Retired , 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   Respiratory: Negative for shortness of breath.    Cardiovascular: Negative for chest pain.   Gastrointestinal: Negative for nausea and vomiting.   Neurological: Negative for dizziness and headaches.     PMH:  has a past medical history of Fall; Hypercholesterolemia; and Hypertension. He also has no past medical history of Backpain.  MEDS:   Current Outpatient Prescriptions:   •  naproxen (NAPROSYN) 375 MG Tab, TAKE 1 TABLET BY MOUTH TWICE DAILY WITH MEALS FOR 7 DAYS, Disp: 14 Tab, Rfl: 0  •  lisinopril-hydrochlorothiazide (PRINZIDE, ZESTORETIC) 10-12.5 MG per tablet, Take 1 Tab by mouth every day., Disp: 90 Tab, Rfl: 4  •  fluticasone (FLONASE) 50 MCG/ACT nasal spray, Spray 1-2 Sprays in nose every day., Disp: 3 Bottle, Rfl: 12  •  atorvastatin (LIPITOR) 40 MG TABS, Take  by mouth. daily, Disp: , Rfl:   •  aspirin 81 MG tablet, Take  by mouth. daily, Disp: , Rfl:   ALLERGIES:   Allergies   Allergen Reactions   • Nkda [No Known Drug Allergy]       "    SURGHX:   Past Surgical History:   Procedure Laterality Date   • HARDWARE REMOVAL ORTHO  1/26/2011    Performed by ROSALINE BENITEZ at SURGERY [facility] ORS   • ANKLE ORIF  6/28/2010    Performed by ROSALINE BENITEZ at SURGERY [facility] ORS   • OTHER ABDOMINAL SURGERY  1970     double hernia repair     SOCHX:  reports that he quit smoking about 24 years ago. He has never used smokeless tobacco. He reports that he does not drink alcohol or use drugs.  FH: Family history was reviewed, no pertinent findings to report       Objective:     /78 (BP Location: Left arm, Patient Position: Sitting, BP Cuff Size: Adult)   Pulse 75   Temp 36.6 °C (97.9 °F) (Temporal)   Resp 16   Ht 1.803 m (5' 11\")   Wt 94 kg (207 lb 3.2 oz)   SpO2 97%   BMI 28.90 kg/m²      Physical Exam     Hand exam    NAD  Alert and oriented    BILATERAL ELBOW exam  Range of motion intact  No swelling  No tenderness the medial or lateral epicondyle  Mayorga test NEGATIVE    BILATERAL WRIST exam  Range of motion decreased to approximately 80% normal motion on the LEFT compared to the right in all planes  POSITIVE MILD tenderness along the LEFT scaphoid, without tenderness along the TFCC insertion, distal radius or distal ulna]  Tinel's testing is NEGATIVE  The hand is otherwise neurovascularly intact    Assessment/Plan:     1. Localized primary osteoarthritis of wrist, left      triscaphe articulation     Discussed treatment options:  Try thumb spica splint which was fitted in the office TODAY (November 15, 2018)  Try Arnicare gel    As well as corticosteroid injection of the LEFT triscaphe articulation under ultrasound guidance    Return in about 4 weeks (around 12/13/2018).  To see how he is doing with splinting/thumb spica of the LEFT wrist with regards to his pain and swelling.  Consider triscaphe articulation corticosteroid injection under ultrasound guidance if symptoms persist or worsen.           10/19/2018 10:47 " AM    HISTORY/REASON FOR EXAM:  Left wrist pain and limited range of motion.      TECHNIQUE/EXAM DESCRIPTION AND NUMBER OF VIEWS:  4 views of the LEFT wrist.    COMPARISON:    FINDINGS:  Bone mineralization is normal. There is no evidence of fracture or dislocation. Soft tissues are normal. There is degenerative change of the triscaphe articulation characterized by joint space loss with osteophytic spurring and subchondral   sclerosis.   Impression       1.  No evidence of acute fracture or dislocation.    2.  Degenerative change of the triscaphe articulation.     Interpreted in the office today with the patient'    Thank you Vickey Watson M.D. for allowing me to participate in caring for your patient.

## 2018-12-13 ENCOUNTER — OFFICE VISIT (OUTPATIENT)
Dept: MEDICAL GROUP | Facility: CLINIC | Age: 58
End: 2018-12-13
Payer: COMMERCIAL

## 2018-12-13 VITALS
DIASTOLIC BLOOD PRESSURE: 82 MMHG | HEIGHT: 71 IN | OXYGEN SATURATION: 95 % | BODY MASS INDEX: 29.01 KG/M2 | WEIGHT: 207.2 LBS | RESPIRATION RATE: 16 BRPM | SYSTOLIC BLOOD PRESSURE: 122 MMHG | HEART RATE: 76 BPM | TEMPERATURE: 98.1 F

## 2018-12-13 DIAGNOSIS — M19.032 LOCALIZED PRIMARY OSTEOARTHRITIS OF WRIST, LEFT: ICD-10-CM

## 2018-12-13 PROCEDURE — 99213 OFFICE O/P EST LOW 20 MIN: CPT | Performed by: FAMILY MEDICINE

## 2018-12-13 NOTE — PROGRESS NOTES
"Subjective:      David Houston is a 58 y.o. male who presents with Wrist Pain (Referral from UC/ L wrist pain )    FOLLOW-up for LEFT wrist osteoarthritis of the triscaphe joint (right-handed dominant)    HPI   LEFT wrist pain (right-handed dominant)  Constant, achy  Minimal to no change since last visit  Continues to use thumb spica splint with some improvement    Retired , likes to play guitar intermittently, and is a ham        Objective:     /78 (BP Location: Left arm, Patient Position: Sitting, BP Cuff Size: Adult)   Pulse 75   Temp 36.6 °C (97.9 °F) (Temporal)   Resp 16   Ht 1.803 m (5' 11\")   Wt 94 kg (207 lb 3.2 oz)   SpO2 97%   BMI 28.90 kg/m²      Physical Exam     Hand exam    NAD  Alert and oriented    BILATERAL WRIST exam  Range of motion decreased to approximately 80% normal motion on the LEFT compared to the right in all planes  POSITIVE MILD tenderness along the LEFT scaphoid, without tenderness along the TFCC insertion, distal radius or distal ulna  The hand is otherwise neurovascularly intact    Assessment/Plan:     1. Localized primary osteoarthritis of wrist, left  REFERRAL TO OCCUPATIONAL THERAPY Reason for Therapy: Eval/Treat/Report     Discussed treatment options:  Continue thumb spica splint which was fitted (November 15, 2018)  Continue Arnicare gel  We will try occupational therapy    Consider corticosteroid injection of the LEFT triscaphe articulation under ultrasound guidance if symptoms do not improve    Return in about 4 weeks (around 1/10/2019).  To see how he is doing with splinting/thumb spica of the LEFT wrist with regards to his pain and swelling.  Consider triscaphe articulation corticosteroid injection under ultrasound guidance if symptoms persist or worsen.           10/19/2018 10:47 AM    HISTORY/REASON FOR EXAM:  Left wrist pain and limited range of motion.      TECHNIQUE/EXAM DESCRIPTION AND NUMBER OF VIEWS:  4 views of " the LEFT wrist.    COMPARISON:    FINDINGS:  Bone mineralization is normal. There is no evidence of fracture or dislocation. Soft tissues are normal. There is degenerative change of the triscaphe articulation characterized by joint space loss with osteophytic spurring and subchondral   sclerosis.   Impression       1.  No evidence of acute fracture or dislocation.    2.  Degenerative change of the triscaphe articulation.     Thank you Vickey Watson M.D. for allowing me to participate in caring for your patient.

## 2019-01-24 ENCOUNTER — PATIENT MESSAGE (OUTPATIENT)
Dept: MEDICAL GROUP | Facility: MEDICAL CENTER | Age: 59
End: 2019-01-24

## 2019-01-24 RX ORDER — ATORVASTATIN CALCIUM 40 MG/1
40 TABLET, FILM COATED ORAL DAILY
Qty: 90 TAB | Refills: 1 | Status: SHIPPED | OUTPATIENT
Start: 2019-01-24 | End: 2019-08-21 | Stop reason: SDUPTHER

## 2019-01-24 NOTE — TELEPHONE ENCOUNTER
"From: David Houston  To: iVckey Watson M.D.  Sent: 1/24/2019 2:11 PM PST  Subject: Prescription Question    Hello,  I thought I had this handled already, but apparently my pharmacy ([email or web address]) does not have a current \"initial\" prescription on-file for my 1 per day, atorvastatin/Lipitor 40 mg. [email or web address] is my regular prescription pharmacy and I am supposed to get a 90 day supply at a time, with as many refills as Dr. Watson deems appropriate. Atorvastatin tablet, 40 mg, 90. They do \"e-prescribe.\"    Thank you and if needed my phone# is [phone]    Celso TORRES  "

## 2019-02-07 ENCOUNTER — OFFICE VISIT (OUTPATIENT)
Dept: MEDICAL GROUP | Facility: CLINIC | Age: 59
End: 2019-02-07
Payer: COMMERCIAL

## 2019-02-07 VITALS
WEIGHT: 207.2 LBS | OXYGEN SATURATION: 98 % | BODY MASS INDEX: 29.01 KG/M2 | SYSTOLIC BLOOD PRESSURE: 118 MMHG | HEART RATE: 78 BPM | TEMPERATURE: 98.2 F | RESPIRATION RATE: 16 BRPM | HEIGHT: 71 IN | DIASTOLIC BLOOD PRESSURE: 76 MMHG

## 2019-02-07 DIAGNOSIS — M19.032 LOCALIZED PRIMARY OSTEOARTHRITIS OF WRIST, LEFT: ICD-10-CM

## 2019-02-07 PROCEDURE — 99213 OFFICE O/P EST LOW 20 MIN: CPT | Performed by: FAMILY MEDICINE

## 2019-02-07 NOTE — PROGRESS NOTES
"Subjective:      David Houston is a 58 y.o. male who presents with Wrist Pain (Referral from UC/ L wrist pain )    FOLLOW-up for LEFT wrist osteoarthritis of the triscaphe joint (right-handed dominant)    HPI   LEFT wrist pain (right-handed dominant)  65-70% improvement with hand therapy  Minimal to no change since last visit  Continues to use thumb spica splint with some improvement    Retired , likes to play guitar intermittently, and is a ham      Objective:     /76 (BP Location: Left arm, Patient Position: Sitting, BP Cuff Size: Adult)   Pulse 78   Temp 36.8 °C (98.2 °F) (Temporal)   Resp 16   Ht 1.803 m (5' 11\")   Wt 94 kg (207 lb 3.2 oz)   SpO2 98%   BMI 28.90 kg/m²     Physical Exam     Hand exam    NAD  Alert and oriented    BILATERAL WRIST exam  Range of motion decreased to approximately 80% normal motion on the LEFT compared to the right in all planes  POSITIVE MILD tenderness along the LEFT scaphoid, without tenderness along the TFCC insertion, distal radius or distal ulna  The hand is otherwise neurovascularly intact    Assessment/Plan:     1. Localized primary osteoarthritis of wrist, left       Discussed treatment options:  Using kineseotape which is helping  Continue Arnicare gel, which helps some  We will try occupational therapy    Consider corticosteroid injection of the LEFT triscaphe articulation under ultrasound guidance if symptoms do not improve    F/u as needed    Consider triscaphe articulation corticosteroid injection under ultrasound guidance if symptoms persist or worsen.           10/19/2018 10:47 AM    HISTORY/REASON FOR EXAM:  Left wrist pain and limited range of motion.      TECHNIQUE/EXAM DESCRIPTION AND NUMBER OF VIEWS:  4 views of the LEFT wrist.    COMPARISON:    FINDINGS:  Bone mineralization is normal. There is no evidence of fracture or dislocation. Soft tissues are normal. There is degenerative change of the triscaphe " articulation characterized by joint space loss with osteophytic spurring and subchondral   sclerosis.   Impression       1.  No evidence of acute fracture or dislocation.    2.  Degenerative change of the triscaphe articulation.     Thank you Vickey Watson M.D. for allowing me to participate in caring for your patient.

## 2019-03-07 DIAGNOSIS — Z12.5 SCREENING FOR PROSTATE CANCER: ICD-10-CM

## 2019-03-07 DIAGNOSIS — Z13.6 SCREENING FOR ISCHEMIC HEART DISEASE: ICD-10-CM

## 2019-03-07 DIAGNOSIS — Z13.1 SCREENING FOR DIABETES MELLITUS: ICD-10-CM

## 2019-04-22 ENCOUNTER — HOSPITAL ENCOUNTER (OUTPATIENT)
Dept: LAB | Facility: MEDICAL CENTER | Age: 59
End: 2019-04-22
Attending: FAMILY MEDICINE
Payer: COMMERCIAL

## 2019-04-22 DIAGNOSIS — Z12.5 SCREENING FOR PROSTATE CANCER: ICD-10-CM

## 2019-04-22 DIAGNOSIS — Z13.1 SCREENING FOR DIABETES MELLITUS: ICD-10-CM

## 2019-04-22 DIAGNOSIS — Z13.6 SCREENING FOR ISCHEMIC HEART DISEASE: ICD-10-CM

## 2019-04-22 LAB
ALBUMIN SERPL BCP-MCNC: 4.6 G/DL (ref 3.2–4.9)
ALBUMIN/GLOB SERPL: 1.9 G/DL
ALP SERPL-CCNC: 72 U/L (ref 30–99)
ALT SERPL-CCNC: 20 U/L (ref 2–50)
ANION GAP SERPL CALC-SCNC: 8 MMOL/L (ref 0–11.9)
AST SERPL-CCNC: 17 U/L (ref 12–45)
BILIRUB SERPL-MCNC: 0.9 MG/DL (ref 0.1–1.5)
BUN SERPL-MCNC: 18 MG/DL (ref 8–22)
CALCIUM SERPL-MCNC: 9.6 MG/DL (ref 8.5–10.5)
CHLORIDE SERPL-SCNC: 103 MMOL/L (ref 96–112)
CHOLEST SERPL-MCNC: 159 MG/DL (ref 100–199)
CO2 SERPL-SCNC: 29 MMOL/L (ref 20–33)
CREAT SERPL-MCNC: 0.77 MG/DL (ref 0.5–1.4)
FASTING STATUS PATIENT QL REPORTED: NORMAL
GLOBULIN SER CALC-MCNC: 2.4 G/DL (ref 1.9–3.5)
GLUCOSE SERPL-MCNC: 89 MG/DL (ref 65–99)
HDLC SERPL-MCNC: 47 MG/DL
LDLC SERPL CALC-MCNC: 101 MG/DL
POTASSIUM SERPL-SCNC: 4 MMOL/L (ref 3.6–5.5)
PROT SERPL-MCNC: 7 G/DL (ref 6–8.2)
PSA SERPL-MCNC: 1.45 NG/ML (ref 0–4)
SODIUM SERPL-SCNC: 140 MMOL/L (ref 135–145)
TRIGL SERPL-MCNC: 55 MG/DL (ref 0–149)

## 2019-04-22 PROCEDURE — 80053 COMPREHEN METABOLIC PANEL: CPT

## 2019-04-22 PROCEDURE — 36415 COLL VENOUS BLD VENIPUNCTURE: CPT

## 2019-04-22 PROCEDURE — 80061 LIPID PANEL: CPT

## 2019-04-22 PROCEDURE — 84153 ASSAY OF PSA TOTAL: CPT

## 2019-06-18 ENCOUNTER — OFFICE VISIT (OUTPATIENT)
Dept: MEDICAL GROUP | Facility: MEDICAL CENTER | Age: 59
End: 2019-06-18
Payer: COMMERCIAL

## 2019-06-18 VITALS
DIASTOLIC BLOOD PRESSURE: 70 MMHG | HEIGHT: 71 IN | BODY MASS INDEX: 27.44 KG/M2 | RESPIRATION RATE: 18 BRPM | TEMPERATURE: 97.9 F | OXYGEN SATURATION: 98 % | SYSTOLIC BLOOD PRESSURE: 104 MMHG | HEART RATE: 63 BPM | WEIGHT: 196 LBS

## 2019-06-18 DIAGNOSIS — I10 ESSENTIAL HYPERTENSION: ICD-10-CM

## 2019-06-18 DIAGNOSIS — Z00.00 HEALTHCARE MAINTENANCE: ICD-10-CM

## 2019-06-18 DIAGNOSIS — E78.5 DYSLIPIDEMIA: ICD-10-CM

## 2019-06-18 PROCEDURE — 99396 PREV VISIT EST AGE 40-64: CPT | Performed by: FAMILY MEDICINE

## 2019-06-18 RX ORDER — LISINOPRIL 10 MG/1
10 TABLET ORAL DAILY
Qty: 90 TAB | Refills: 0 | Status: SHIPPED | OUTPATIENT
Start: 2019-06-18 | End: 2019-08-19 | Stop reason: SDUPTHER

## 2019-06-18 RX ORDER — YEAST,DRIED (S. CEREVISIAE)
1 POWDER (GRAM) ORAL DAILY
COMMUNITY
End: 2023-08-28

## 2019-06-18 RX ORDER — LORAZEPAM 0.5 MG
1-2 TABLET ORAL DAILY
COMMUNITY
End: 2023-08-28

## 2019-06-18 ASSESSMENT — PATIENT HEALTH QUESTIONNAIRE - PHQ9: CLINICAL INTERPRETATION OF PHQ2 SCORE: 0

## 2019-06-18 NOTE — PROGRESS NOTES
[facility]  Progress Note  Established Patient    Subjective:   David Houston is a 58 y.o. male here today for a wellness exam. The patient is alone.     Healthcare maintenance  Lipids: controlled with atorvastatin.   Fasting Glucose: done 2019 and normal.   Hepatitis C Screen: done 2018 and normal.   Colonoscopy: done 2018, single tubular adenoma.   PSA: done 2019 and normal.     Tdap: given 2010.  Shingrix vaccine: recommended, patient will get at pharmacy.     Dyslipidemia  Patient's dyslipidemia is controlled with atorvastatin.    Essential hypertension  Patient's blood pressure is a little bit low today.  He denies any new dizziness or lightheadedness.      Current Outpatient Prescriptions on File Prior to Visit   Medication Sig Dispense Refill   • atorvastatin (LIPITOR) 40 MG Tab Take 1 Tab by mouth every day. daily 90 Tab 1   • fluticasone (FLONASE) 50 MCG/ACT nasal spray Spray 1-2 Sprays in nose every day. 3 Bottle 12   • aspirin 81 MG tablet Take  by mouth. daily       No current facility-administered medications on file prior to visit.        Past Medical History:   Diagnosis Date   • Fall    • Hypercholesterolemia    • Hypertension        Allergies: Nkda [no known drug allergy]    Surgical History:  has a past surgical history that includes ankle orif (6/28/2010); other abdominal surgery (1970 ); and hardware removal ortho (1/26/2011).    Family History: family history includes Cancer in his father; Dementia in his father; No Known Problems in his mother.    Social History:  reports that he quit smoking about 25 years ago. He has never used smokeless tobacco. He reports that he drinks alcohol. He reports that he does not use drugs.    ROS: no dizziness or ligthheadedness.        Objective:     Vitals:    06/18/19 1000   BP: 104/70   BP Location: Left leg   Patient Position: Sitting   BP Cuff Size: Adult   Pulse: 63   Resp: 18   Temp: 36.6 °C (97.9 °F)   TempSrc: Temporal   SpO2: 98%  "  Weight: 88.9 kg (196 lb)   Height: 1.803 m (5' 11\")       Physical Exam:  General: alert in no apparent distress.   Cardio: regular rate and rhythm, no murmurs, rubs or gallops.   Resp: CTAB no w/r/r.         Assessment and Plan:     1. Essential hypertension  - stop lisinopril/hctz, start lisinopril alone.   - lisinopril (PRINIVIL) 10 MG Tab; Take 1 Tab by mouth every day.  Dispense: 90 Tab; Refill: 0    2. Dyslipidemia  - continue statin.     3. Healthcare maintenance  - see HPI.   - discussed diet and exercise.         Followup: Return in about 4 weeks (around 7/16/2019), or if symptoms worsen or fail to improve, for HTN.         "

## 2019-06-18 NOTE — ASSESSMENT & PLAN NOTE
Lipids: controlled with atorvastatin.   Fasting Glucose: done 2019 and normal.   Hepatitis C Screen: done 2018 and normal.   Colonoscopy: done 2018, single tubular adenoma.   PSA: done 2019 and normal.     Tdap: given 2010.  Shingrix vaccine: recommended, patient will get at pharmacy.

## 2019-06-18 NOTE — ASSESSMENT & PLAN NOTE
Patient's blood pressure is a little bit low today.  He denies any new dizziness or lightheadedness.

## 2019-07-16 ENCOUNTER — OFFICE VISIT (OUTPATIENT)
Dept: MEDICAL GROUP | Facility: MEDICAL CENTER | Age: 59
End: 2019-07-16
Payer: COMMERCIAL

## 2019-07-16 VITALS
BODY MASS INDEX: 27.38 KG/M2 | DIASTOLIC BLOOD PRESSURE: 82 MMHG | HEIGHT: 71 IN | TEMPERATURE: 97 F | HEART RATE: 72 BPM | RESPIRATION RATE: 16 BRPM | SYSTOLIC BLOOD PRESSURE: 130 MMHG | OXYGEN SATURATION: 98 % | WEIGHT: 195.6 LBS

## 2019-07-16 DIAGNOSIS — I10 ESSENTIAL HYPERTENSION: ICD-10-CM

## 2019-07-16 PROBLEM — E80.6 HYPERBILIRUBINEMIA: Status: RESOLVED | Noted: 2018-06-19 | Resolved: 2019-07-16

## 2019-07-16 PROCEDURE — 99212 OFFICE O/P EST SF 10 MIN: CPT | Performed by: FAMILY MEDICINE

## 2019-07-16 NOTE — PROGRESS NOTES
"[facility]  Progress Note  Established Patient    Subjective:   David Houston is a 58 y.o. male here today with a chief complaint of high blood pressure. The patient is alone.     Essential hypertension  Patient's blood pressure is controlled on lisinopril.      Current Outpatient Prescriptions on File Prior to Visit   Medication Sig Dispense Refill   • Turmeric 500 MG Cap Take 1,000 mg by mouth every day.     • Coenzyme Q10 (CO Q 10) 100 MG Cap Take 1 Tab by mouth every day.     • Misc Natural Products (TART WILKERSON ADVANCED) Cap Take 1 Cap by mouth every day.     • Glucos-Chond-Hyal Ac-Ca Fructo (MOVE FREE JOINT HEALTH ADVANCE) Tab Take 1 Tab by mouth every day.     • lisinopril (PRINIVIL) 10 MG Tab Take 1 Tab by mouth every day. 90 Tab 0   • atorvastatin (LIPITOR) 40 MG Tab Take 1 Tab by mouth every day. daily 90 Tab 1   • fluticasone (FLONASE) 50 MCG/ACT nasal spray Spray 1-2 Sprays in nose every day. 3 Bottle 12   • aspirin 81 MG tablet Take  by mouth. daily       No current facility-administered medications on file prior to visit.        Past Medical History:   Diagnosis Date   • Fall    • Hypercholesterolemia    • Hypertension        Allergies: Nkda [no known drug allergy]    Surgical History:  has a past surgical history that includes ankle orif (6/28/2010); other abdominal surgery (1970 ); and hardware removal ortho (1/26/2011).    Family History: family history includes Cancer in his father; Dementia in his father; No Known Problems in his mother.    Social History:  reports that he quit smoking about 25 years ago. He has never used smokeless tobacco. He reports that he drinks alcohol. He reports that he does not use drugs.    ROS: no fever.        Objective:     Vitals:    07/16/19 0849   BP: 130/82   BP Location: Left arm   Patient Position: Sitting   Pulse: 72   Resp: 16   Temp: 36.1 °C (97 °F)   TempSrc: Temporal   SpO2: 98%   Weight: 88.7 kg (195 lb 9.6 oz)   Height: 1.803 m (5' 11\") "       Physical Exam:  General: alert in no apparent distress.         Assessment and Plan:     1. Essential hypertension  - continue lisinopril.   - Basic Metabolic Panel; Future        Followup: Return in about 9 months (around 4/16/2020), or if symptoms worsen or fail to improve, for Wellness Visit, Long.

## 2019-07-24 ENCOUNTER — HOSPITAL ENCOUNTER (OUTPATIENT)
Dept: LAB | Facility: MEDICAL CENTER | Age: 59
End: 2019-07-24
Attending: FAMILY MEDICINE
Payer: COMMERCIAL

## 2019-07-24 DIAGNOSIS — I10 ESSENTIAL HYPERTENSION: ICD-10-CM

## 2019-07-24 LAB
ANION GAP SERPL CALC-SCNC: 7 MMOL/L (ref 0–11.9)
BUN SERPL-MCNC: 9 MG/DL (ref 8–22)
CALCIUM SERPL-MCNC: 9 MG/DL (ref 8.5–10.5)
CHLORIDE SERPL-SCNC: 105 MMOL/L (ref 96–112)
CO2 SERPL-SCNC: 28 MMOL/L (ref 20–33)
CREAT SERPL-MCNC: 0.74 MG/DL (ref 0.5–1.4)
GLUCOSE SERPL-MCNC: 88 MG/DL (ref 65–99)
POTASSIUM SERPL-SCNC: 4.1 MMOL/L (ref 3.6–5.5)
SODIUM SERPL-SCNC: 140 MMOL/L (ref 135–145)

## 2019-07-24 PROCEDURE — 36415 COLL VENOUS BLD VENIPUNCTURE: CPT

## 2019-07-24 PROCEDURE — 80048 BASIC METABOLIC PNL TOTAL CA: CPT

## 2019-08-02 ENCOUNTER — OFFICE VISIT (OUTPATIENT)
Dept: MEDICAL GROUP | Facility: MEDICAL CENTER | Age: 59
End: 2019-08-02
Payer: COMMERCIAL

## 2019-08-02 VITALS
WEIGHT: 202.8 LBS | HEIGHT: 71 IN | RESPIRATION RATE: 16 BRPM | DIASTOLIC BLOOD PRESSURE: 72 MMHG | BODY MASS INDEX: 28.39 KG/M2 | SYSTOLIC BLOOD PRESSURE: 114 MMHG | OXYGEN SATURATION: 98 % | HEART RATE: 57 BPM | TEMPERATURE: 97.7 F

## 2019-08-02 DIAGNOSIS — H92.02 LEFT EAR PAIN: ICD-10-CM

## 2019-08-02 PROCEDURE — 99214 OFFICE O/P EST MOD 30 MIN: CPT | Performed by: FAMILY MEDICINE

## 2019-08-02 RX ORDER — METHYLPREDNISOLONE 4 MG/1
TABLET ORAL
Qty: 21 TAB | Refills: 0 | Status: SHIPPED | OUTPATIENT
Start: 2019-08-02 | End: 2019-09-16

## 2019-08-02 NOTE — PROGRESS NOTES
[facility]  Progress Note  Established Patient    Subjective:   David Houston is a 58 y.o. male here today with a chief complaint of ear pain. The patient is alone.     Left ear pain  Patient describes a 1 week history of deep-seated, sharp, moderate severity left ear pain that occurs at night.  It is not currently present.  There is some associated left jaw pain with his occurs.  Patient denies chest pain or shortness of breath.  He has not really tried anything to help.  He is already taking Flonase.  There are no known aggravating factors.       Current Outpatient Medications on File Prior to Visit   Medication Sig Dispense Refill   • fluticasone (FLONASE) 50 MCG/ACT nasal spray USE 1 TO 2 SPRAYS IN EACH NOSTRIL EVERY DAY 48 Bottle 11   • Turmeric 500 MG Cap Take 1,000 mg by mouth every day.     • Coenzyme Q10 (CO Q 10) 100 MG Cap Take 1 Tab by mouth every day.     • Misc Natural Products (TART WILKERSON ADVANCED) Cap Take 1 Cap by mouth every day.     • Glucos-Chond-Hyal Ac-Ca Fructo (MOVE FREE JOINT HEALTH ADVANCE) Tab Take 1 Tab by mouth every day.     • lisinopril (PRINIVIL) 10 MG Tab Take 1 Tab by mouth every day. 90 Tab 0   • atorvastatin (LIPITOR) 40 MG Tab Take 1 Tab by mouth every day. daily 90 Tab 1   • aspirin 81 MG tablet Take  by mouth. daily     • NON SPECIFIED        No current facility-administered medications on file prior to visit.        Past Medical History:   Diagnosis Date   • Fall    • Hypercholesterolemia    • Hypertension        Allergies: Nkda [no known drug allergy]    Surgical History:  has a past surgical history that includes ankle orif (6/28/2010); other abdominal surgery (1970 ); and hardware removal ortho (1/26/2011).    Family History: family history includes Cancer in his father; Dementia in his father; No Known Problems in his mother.    Social History:  reports that he quit smoking about 25 years ago. He has never used smokeless tobacco. He reports that he drinks  "alcohol. He reports that he does not use drugs.    ROS: no fever or nausea.  Denies hearing problems.       Objective:     Vitals:    08/02/19 1033   BP: 114/72   BP Location: Left arm   Patient Position: Sitting   BP Cuff Size: Adult   Pulse: (!) 57   Resp: 16   Temp: 36.5 °C (97.7 °F)   TempSrc: Temporal   SpO2: 98%   Weight: 92 kg (202 lb 12.8 oz)   Height: 1.803 m (5' 11\")       Physical Exam:  General: alert in no apparent distress.   ENMT: Tympanic membranes intact and normal bilaterally.  No acoustic canal redness, swelling or warmth bilaterally.  No mastoid tenderness bilaterally.  No pharyngeal erythema, no tonsillar exudates.  There is some slight pharyngeal cobblestoning.  No significant dental caries.  No oropharyngeal cellulitis.      Assessment and Plan:     1. Left ear pain  This is an undiagnosed new problem with an uncertain prognosis.  Patient's exam is normal with the exception of some slight pharyngeal cobblestoning.  I suppose this could represent eustachian tube dysfunction.  The patient is already on Flonase so I will give him methylprednisolone.  I will also treat with Cortisporin drops.  This does not appear to be consistent with mastoiditis, parotitis, acute otitis media or dental abscess, however, I suppose the patient could be developing a infection we are just in early stages.  I will treat with medication below and we will see how the clinical course progresses.  I informed the patient that if he worsens over the weekend, he needs to the urgent care.  I informed him that if his symptoms have not resolved within 1 week, he needs to return.  - neomycin sulf/polymyx B sulf/HC soln (CORTISPORIN HC SOL) 3.5-23492-7 Solution; Place 3 Drops in ear 4 times a day. Administer drops to both ears.  Dispense: 1 Bottle; Refill: 0  - methylPREDNISolone (MEDROL DOSEPAK) 4 MG Tablet Therapy Pack; As directed on the packaging label.  Dispense: 21 Tab; Refill: 0        Followup: Return if symptoms worsen " or fail to improve.

## 2019-08-02 NOTE — ASSESSMENT & PLAN NOTE
Patient describes a 1 week history of deep-seated, sharp, moderate severity left ear pain that occurs at night.  It is not currently present.  There is some associated left jaw pain with his occurs.  Patient denies chest pain or shortness of breath.  He has not really tried anything to help.  He is already taking Flonase.  There are no known aggravating factors.

## 2019-09-16 ENCOUNTER — OFFICE VISIT (OUTPATIENT)
Dept: MEDICAL GROUP | Facility: MEDICAL CENTER | Age: 59
End: 2019-09-16
Payer: COMMERCIAL

## 2019-09-16 VITALS
HEART RATE: 56 BPM | DIASTOLIC BLOOD PRESSURE: 78 MMHG | RESPIRATION RATE: 18 BRPM | OXYGEN SATURATION: 96 % | BODY MASS INDEX: 27.35 KG/M2 | HEIGHT: 71 IN | SYSTOLIC BLOOD PRESSURE: 118 MMHG | WEIGHT: 195.4 LBS | TEMPERATURE: 97.6 F

## 2019-09-16 DIAGNOSIS — H92.02 LEFT EAR PAIN: ICD-10-CM

## 2019-09-16 DIAGNOSIS — Z23 NEED FOR VACCINATION: ICD-10-CM

## 2019-09-16 PROCEDURE — 90686 IIV4 VACC NO PRSV 0.5 ML IM: CPT | Performed by: FAMILY MEDICINE

## 2019-09-16 PROCEDURE — 99213 OFFICE O/P EST LOW 20 MIN: CPT | Mod: 25 | Performed by: FAMILY MEDICINE

## 2019-09-16 PROCEDURE — 90471 IMMUNIZATION ADMIN: CPT | Performed by: FAMILY MEDICINE

## 2019-09-16 NOTE — PROGRESS NOTES
[facility]  Progress Note  Established Patient    Subjective:   David Houston is a 58 y.o. male here today with a chief complaint of ear plugging. The patient is alone.     Left ear pain  I saw the patient 6 weeks ago for left ear pain. I treated him with flonase, cortisporin HC drops and a medrol dosepack, suspecting eustachian tube dysfunction. Today, the states the pain has improved by he continues to feel like the left ear is plugged. He can open it up by holding his nose and blowing but then it comes right back. The medications I prescribed don't seem to have helped.  He will be traveling on an airplane in about 6 weeks and so he wants to get this taken care of.      Current Outpatient Medications on File Prior to Visit   Medication Sig Dispense Refill   • atorvastatin (LIPITOR) 40 MG Tab TAKE 1 TABLET DAILY 100 Tab 4   • lisinopril (PRINIVIL) 10 MG Tab TAKE 1 TABLET DAILY 100 Tab 4   • neomycin sulf/polymyx B sulf/HC soln (CORTISPORIN HC SOL) 3.5-36334-4 Solution Place 3 Drops in ear 4 times a day. Administer drops to both ears. 1 Bottle 0   • fluticasone (FLONASE) 50 MCG/ACT nasal spray USE 1 TO 2 SPRAYS IN EACH NOSTRIL EVERY DAY 48 Bottle 11   • Turmeric 500 MG Cap Take 1,000 mg by mouth every day.     • Coenzyme Q10 (CO Q 10) 100 MG Cap Take 1 Tab by mouth every day.     • Misc Natural Products (TART WILKERSON ADVANCED) Cap Take 1 Cap by mouth every day.     • Glucos-Chond-Hyal Ac-Ca Fructo (MOVE FREE [facility] ADVANCE) Tab Take 1 Tab by mouth every day.     • aspirin 81 MG tablet Take  by mouth. daily     • NON SPECIFIED        No current facility-administered medications on file prior to visit.        Past Medical History:   Diagnosis Date   • Fall    • Hypercholesterolemia    • Hypertension        Allergies: Nkda [no known drug allergy]    Surgical History:  has a past surgical history that includes ankle orif (6/28/2010); other abdominal surgery (1970 ); and hardware removal ortho  "(1/26/2011).    Family History: family history includes Cancer in his father; Dementia in his father; No Known Problems in his mother.    Social History:  reports that he quit smoking about 25 years ago. He has never used smokeless tobacco. He reports that he drinks alcohol. He reports that he does not use drugs.    ROS: no fever.        Objective:     Vitals:    09/16/19 1036   BP: 118/78   BP Location: Left arm   Patient Position: Sitting   BP Cuff Size: Adult   Pulse: (!) 56   Resp: 18   Temp: 36.4 °C (97.6 °F)   TempSrc: Temporal   SpO2: 96%   Weight: 88.6 kg (195 lb 6.4 oz)   Height: 1.803 m (5' 11\")       Physical Exam:  General: alert in no apparent distress.   ENMT: TM normal bilaterally.     Assessment and Plan:     1. Need for vaccination  - Influenza Vaccine Quad Injection (PF)    2. Left ear pain  - again suspect eustachian tube dysfunction. No improvement with flonase, Medrol dosepack or cortisporin HC drops. Recommended he try afrin nasal spray x 1 week. If this doesn't work, recommended pseudaphedrine x 1 week. If no response, patient will make appt with ENT.   - REFERRAL TO ENT        Followup: Return if symptoms worsen or fail to improve.         "

## 2019-09-16 NOTE — ASSESSMENT & PLAN NOTE
I saw the patient 6 weeks ago for left ear pain. I treated him with flonase, cortisporin HC drops and a medrol dosepack, suspecting eustachian tube dysfunction. Today, the states the pain has improved by he continues to feel like the left ear is plugged. He can open it up by holding his nose and blowing but then it comes right back. The medications I prescribed don't seem to have helped.  He will be traveling on an airplane in about 6 weeks and so he wants to get this taken care of.

## 2020-01-06 DIAGNOSIS — I10 ESSENTIAL HYPERTENSION: ICD-10-CM

## 2020-01-06 RX ORDER — LISINOPRIL AND HYDROCHLOROTHIAZIDE 12.5; 1 MG/1; MG/1
1 TABLET ORAL DAILY
Qty: 90 TAB | Refills: 4 | OUTPATIENT
Start: 2020-01-06

## 2020-01-06 RX ORDER — LISINOPRIL 10 MG/1
10 TABLET ORAL DAILY
Qty: 100 TAB | Refills: 4 | Status: SHIPPED | OUTPATIENT
Start: 2020-01-06 | End: 2020-06-23

## 2020-02-19 ENCOUNTER — OFFICE VISIT (OUTPATIENT)
Dept: MEDICAL GROUP | Facility: MEDICAL CENTER | Age: 60
End: 2020-02-19
Payer: COMMERCIAL

## 2020-02-19 VITALS
DIASTOLIC BLOOD PRESSURE: 68 MMHG | RESPIRATION RATE: 18 BRPM | OXYGEN SATURATION: 97 % | TEMPERATURE: 97.5 F | HEIGHT: 71 IN | WEIGHT: 200 LBS | SYSTOLIC BLOOD PRESSURE: 108 MMHG | BODY MASS INDEX: 28 KG/M2 | HEART RATE: 60 BPM

## 2020-02-19 DIAGNOSIS — R27.0 ATAXIA: ICD-10-CM

## 2020-02-19 DIAGNOSIS — E78.5 DYSLIPIDEMIA: ICD-10-CM

## 2020-02-19 DIAGNOSIS — I10 ESSENTIAL HYPERTENSION: ICD-10-CM

## 2020-02-19 DIAGNOSIS — Z12.5 SCREENING FOR PROSTATE CANCER: ICD-10-CM

## 2020-02-19 DIAGNOSIS — J32.9 SINUSITIS, UNSPECIFIED CHRONICITY, UNSPECIFIED LOCATION: ICD-10-CM

## 2020-02-19 PROCEDURE — 99214 OFFICE O/P EST MOD 30 MIN: CPT | Performed by: FAMILY MEDICINE

## 2020-02-19 RX ORDER — AMOXICILLIN AND CLAVULANATE POTASSIUM 875; 125 MG/1; MG/1
1 TABLET, FILM COATED ORAL 2 TIMES DAILY
Qty: 10 TAB | Refills: 0 | Status: SHIPPED | OUTPATIENT
Start: 2020-02-19 | End: 2020-02-24

## 2020-02-19 ASSESSMENT — PATIENT HEALTH QUESTIONNAIRE - PHQ9: CLINICAL INTERPRETATION OF PHQ2 SCORE: 0

## 2020-02-19 NOTE — ASSESSMENT & PLAN NOTE
Patient presents with a 2-week history of head congestion with slight rhinorrhea, slight cough, tickle in the throat and head pressure.  He denies chest pain or shortness of breath.  He denies fevers or chills.  He is using Flonase but this does not seem to be helping.

## 2020-02-19 NOTE — ASSESSMENT & PLAN NOTE
The patient has a fairly longstanding history of ataxia.  He has seen a number of different subspecialist and had neuroimaging for this issue.  The last neurologist he saw suggested a possible genetic component and recommended physical therapy and consideration of seeing a tertiary care center.  Patient did not go through physical therapy and is not currently interested in doing that.  He would be willing to see [place] neurology, however.  Patient states that he has not been too aggressive about pursuing a work-up for this because he has already gone through a lot of diagnostics without a clear answer.

## 2020-02-19 NOTE — PROGRESS NOTES
[facility]  Progress Note  Established Patient    Subjective:   David Houston is a 59 y.o. male here today with a chief complaint of sinusitis. The patient is alone.     Sinusitis  Patient presents with a 2-week history of head congestion with slight rhinorrhea, slight cough, tickle in the throat and head pressure.  He denies chest pain or shortness of breath.  He denies fevers or chills.  He is using Flonase but this does not seem to be helping.    Essential hypertension  The patient's blood pressure is at goal.    Dyslipidemia  Patient's dyslipidemia is controlled with atorvastatin.  He would like to recheck his lipid panel.    Ataxia  The patient has a fairly longstanding history of ataxia.  He has seen a number of different subspecialist and had neuroimaging for this issue.  The last neurologist he saw suggested a possible genetic component and recommended physical therapy and consideration of seeing a tertiary care center.  Patient did not go through physical therapy and is not currently interested in doing that.  He would be willing to see [place] neurology, however.  Patient states that he has not been too aggressive about pursuing a work-up for this because he has already gone through a lot of diagnostics without a clear answer.      Current Outpatient Medications on File Prior to Visit   Medication Sig Dispense Refill   • lisinopril (PRINIVIL) 10 MG Tab Take 1 Tab by mouth every day. 100 Tab 4   • atorvastatin (LIPITOR) 40 MG Tab TAKE 1 TABLET DAILY 100 Tab 4   • fluticasone (FLONASE) 50 MCG/ACT nasal spray USE 1 TO 2 SPRAYS IN EACH NOSTRIL EVERY DAY 48 Bottle 11   • TURMERIC PO Take 2,600 mg by mouth every day.     • Coenzyme Q10 (CO Q 10) 100 MG Cap Take 100 mg by mouth every day. Uviquonol     • Misc Natural Products (TART WILKERSON ADVANCED) Cap Take 1-2 Caps by mouth every day.     • Glucos-Chond-Hyal Ac-Ca Fructo (M[facility] FREE [facility] ADVANCE) Tab Take 1 Tab by mouth every day.     •  "aspirin 81 MG tablet Take  by mouth. daily     • NON SPECIFIED        No current facility-administered medications on file prior to visit.        Past Medical History:   Diagnosis Date   • Fall    • Hypercholesterolemia    • Hypertension        Allergies: Nkda [no known drug allergy]    Surgical History:  has a past surgical history that includes ankle orif (6/28/2010); other abdominal surgery (1970 ); and hardware removal ortho (1/26/2011).    Family History: family history includes Cancer in his father; Dementia in his father; No Known Problems in his mother.    Social History:  reports that he quit smoking about 25 years ago. He has never used smokeless tobacco. He reports current alcohol use. He reports that he does not use drugs.    ROS: see HPI.        Objective:     Vitals:    02/19/20 0818   BP: 108/68   BP Location: Left arm   Patient Position: Sitting   BP Cuff Size: Large adult   Pulse: 60   Resp: 18   Temp: 36.4 °C (97.5 °F)   TempSrc: Temporal   SpO2: 97%   Weight: 90.7 kg (200 lb)   Height: 1.791 m (5' 10.5\")       Physical Exam:  General: alert in no apparent distress.   Cardio: regular rate and rhythm, no murmurs, rubs or gallops.   Resp: CTAB no w/r/r.   ENMT: Tympanic membranes normal bilaterally, no pharyngeal erythema, no tonsillar exudates.  No cervical lymphadenopathy.      Assessment and Plan:     1. Essential hypertension  - continue current regimen.   - Comp Metabolic Panel; Future    2. Ataxia  I did recommend the patient continue to pursue a diagnosis for his progressive ataxia with concerning MRI findings. He last saw a neurologist here who suggested a possible acquired condition or genetic condition such as spinal cerebellar ataxia.  This neurologist considered a tertiary care center.  The patient is interested in pursuing this and I will send him to neurology in [place].   - REFERRAL TO NEUROLOGY    3. Sinusitis, unspecified chronicity, unspecified location  -Recommend NeilMed sinus " rinse followed by Flonase at night and Claritin in the morning.  If no improvement within 3 days, the patient will start Augmentin.  If no resolution within about a week, the patient will return.  - amoxicillin-clavulanate (AUGMENTIN) 875-125 MG Tab; Take 1 Tab by mouth 2 times a day for 5 days.  Dispense: 10 Tab; Refill: 0    4. Screening for prostate cancer  - PROSTATE SPECIFIC AG SCREENING; Future    5. Dyslipidemia  - continue statin.   - Lipid Profile; Future        Followup: Return if symptoms worsen or fail to improve.

## 2020-02-19 NOTE — ASSESSMENT & PLAN NOTE
Patient's dyslipidemia is controlled with atorvastatin.  He would like to recheck his lipid panel.

## 2020-05-19 ENCOUNTER — HOSPITAL ENCOUNTER (OUTPATIENT)
Dept: LAB | Facility: MEDICAL CENTER | Age: 60
End: 2020-05-19
Attending: FAMILY MEDICINE
Payer: COMMERCIAL

## 2020-05-19 DIAGNOSIS — E78.5 DYSLIPIDEMIA: ICD-10-CM

## 2020-05-19 DIAGNOSIS — Z12.5 SCREENING FOR PROSTATE CANCER: ICD-10-CM

## 2020-05-19 DIAGNOSIS — I10 ESSENTIAL HYPERTENSION: ICD-10-CM

## 2020-05-19 LAB
ALBUMIN SERPL BCP-MCNC: 4.6 G/DL (ref 3.2–4.9)
ALBUMIN/GLOB SERPL: 2 G/DL
ALP SERPL-CCNC: 79 U/L (ref 30–99)
ALT SERPL-CCNC: 38 U/L (ref 2–50)
ANION GAP SERPL CALC-SCNC: 10 MMOL/L (ref 7–16)
AST SERPL-CCNC: 24 U/L (ref 12–45)
BILIRUB SERPL-MCNC: 1.5 MG/DL (ref 0.1–1.5)
BUN SERPL-MCNC: 8 MG/DL (ref 8–22)
CALCIUM SERPL-MCNC: 9.6 MG/DL (ref 8.5–10.5)
CHLORIDE SERPL-SCNC: 100 MMOL/L (ref 96–112)
CHOLEST SERPL-MCNC: 136 MG/DL (ref 100–199)
CO2 SERPL-SCNC: 27 MMOL/L (ref 20–33)
CREAT SERPL-MCNC: 0.7 MG/DL (ref 0.5–1.4)
FASTING STATUS PATIENT QL REPORTED: NORMAL
GLOBULIN SER CALC-MCNC: 2.3 G/DL (ref 1.9–3.5)
GLUCOSE SERPL-MCNC: 99 MG/DL (ref 65–99)
HDLC SERPL-MCNC: 48 MG/DL
LDLC SERPL CALC-MCNC: 66 MG/DL
POTASSIUM SERPL-SCNC: 4.2 MMOL/L (ref 3.6–5.5)
PROT SERPL-MCNC: 6.9 G/DL (ref 6–8.2)
PSA SERPL-MCNC: 0.82 NG/ML (ref 0–4)
SODIUM SERPL-SCNC: 137 MMOL/L (ref 135–145)
TRIGL SERPL-MCNC: 110 MG/DL (ref 0–149)

## 2020-05-19 PROCEDURE — 80061 LIPID PANEL: CPT

## 2020-05-19 PROCEDURE — 80053 COMPREHEN METABOLIC PANEL: CPT

## 2020-05-19 PROCEDURE — 36415 COLL VENOUS BLD VENIPUNCTURE: CPT

## 2020-05-19 PROCEDURE — 84153 ASSAY OF PSA TOTAL: CPT

## 2020-06-23 ENCOUNTER — OFFICE VISIT (OUTPATIENT)
Dept: MEDICAL GROUP | Facility: MEDICAL CENTER | Age: 60
End: 2020-06-23
Payer: COMMERCIAL

## 2020-06-23 VITALS
HEART RATE: 70 BPM | HEIGHT: 71 IN | BODY MASS INDEX: 27.47 KG/M2 | TEMPERATURE: 97.2 F | RESPIRATION RATE: 18 BRPM | SYSTOLIC BLOOD PRESSURE: 104 MMHG | DIASTOLIC BLOOD PRESSURE: 64 MMHG | WEIGHT: 196.2 LBS | OXYGEN SATURATION: 97 %

## 2020-06-23 DIAGNOSIS — Z00.00 HEALTHCARE MAINTENANCE: ICD-10-CM

## 2020-06-23 DIAGNOSIS — I10 ESSENTIAL HYPERTENSION: ICD-10-CM

## 2020-06-23 DIAGNOSIS — E78.5 DYSLIPIDEMIA: ICD-10-CM

## 2020-06-23 PROBLEM — J32.9 SINUSITIS: Status: RESOLVED | Noted: 2020-02-19 | Resolved: 2020-06-23

## 2020-06-23 PROCEDURE — 99396 PREV VISIT EST AGE 40-64: CPT | Performed by: FAMILY MEDICINE

## 2020-06-23 RX ORDER — LISINOPRIL 5 MG/1
5 TABLET ORAL DAILY
Qty: 100 TAB | Refills: 4 | Status: SHIPPED | OUTPATIENT
Start: 2020-06-23 | End: 2020-09-08

## 2020-06-23 ASSESSMENT — FIBROSIS 4 INDEX: FIB4 SCORE: 1.08

## 2020-06-23 NOTE — ASSESSMENT & PLAN NOTE
Lipids: controlled with atorvastatin.   Fasting Glucose: done 2020 and normal.   Hepatitis C Screen: done 2018 and normal.   Colonoscopy: done 2018, single tubular adenoma.   PSA: done 2020 and normal.     Tdap: given 2010.

## 2020-06-23 NOTE — ASSESSMENT & PLAN NOTE
At goal, even a bit low. Patient denies dizziness or lightheadedness but does have some ataxia which is longstanding.

## 2020-06-23 NOTE — PROGRESS NOTES
[facility]  Progress Note  Established Patient    Subjective:   David Houston is a 59 y.o. male here today for a wellness exam. The patient is alone.     Healthcare maintenance  Lipids: controlled with atorvastatin.   Fasting Glucose: done 2020 and normal.   Hepatitis C Screen: done 2018 and normal.   Colonoscopy: done 2018, single tubular adenoma.   PSA: done 2020 and normal.     Tdap: given 2010.    Essential hypertension  At goal, even a bit low. Patient denies dizziness or lightheadedness but does have some ataxia which is longstanding.    Dyslipidemia  Under excellent control.       Current Outpatient Medications on File Prior to Visit   Medication Sig Dispense Refill   • Psyllium (METAMUCIL PO) Take 1 Each by mouth 2 Times a Day.     • atorvastatin (LIPITOR) 40 MG Tab TAKE 1 TABLET DAILY 100 Tab 4   • fluticasone (FLONASE) 50 MCG/ACT nasal spray USE 1 TO 2 SPRAYS IN EACH NOSTRIL EVERY DAY 48 Bottle 11   • TURMERIC PO Take 2,600 mg by mouth every day.     • Coenzyme Q10 (CO Q 10) 100 MG Cap Take 100 mg by mouth every day. Uviquonol     • Misc Natural Products (TART WILKERSON ADVANCED) Cap Take 1-2 Caps by mouth every day.     • Glucos-Chond-Hyal Ac-Ca Fructo (MOVE FREE JOINT HEALTH ADVANCE) Tab Take 1 Tab by mouth every day.     • aspirin 81 MG tablet Take  by mouth. daily     • NON SPECIFIED        No current facility-administered medications on file prior to visit.        Past Medical History:   Diagnosis Date   • Fall    • Hypercholesterolemia    • Hypertension        Allergies: Nkda [no known drug allergy]    Surgical History:  has a past surgical history that includes ankle orif (6/28/2010); other abdominal surgery (1970 ); and hardware removal ortho (1/26/2011).    Family History: family history includes Cancer in his father; Dementia in his father; No Known Problems in his mother.    Social History:  reports that he quit smoking about 26 years ago. He has never used smokeless tobacco. He  "reports current alcohol use. He reports that he does not use drugs.    ROS: no fever or cough.        Objective:     Vitals:    06/23/20 0903   BP: 104/64   BP Location: Left arm   Patient Position: Sitting   BP Cuff Size: Large adult   Pulse: 70   Resp: 18   Temp: 36.2 °C (97.2 °F)   TempSrc: Temporal   SpO2: 97%   Weight: 89 kg (196 lb 3.2 oz)   Height: 1.803 m (5' 11\")       Physical Exam:  General: alert in no apparent distress.   Cardio: regular rate and rhythm, no murmurs, rubs or gallops.   Resp: CTAB no w/r/r.         Assessment and Plan:     1. Healthcare maintenance  - see HPI.   -Age-appropriate as per guidance discussed including diet and exercise.    2. Essential hypertension  Reduce lisinopril dose with home BP monitoring.   - lisinopril (PRINIVIL) 5 MG Tab; Take 1 Tab by mouth every day.  Dispense: 100 Tab; Refill: 4    3. Dyslipidemia  - continue statin.         Followup: Return in about 6 months (around 12/23/2020), or if symptoms worsen or fail to improve.         "

## 2020-08-25 DIAGNOSIS — J30.1 SEASONAL ALLERGIC RHINITIS DUE TO POLLEN: ICD-10-CM

## 2020-08-26 RX ORDER — FLUTICASONE PROPIONATE 50 MCG
SPRAY, SUSPENSION (ML) NASAL
Qty: 48 G | Refills: 3 | Status: SHIPPED | OUTPATIENT
Start: 2020-08-26 | End: 2021-10-05 | Stop reason: SDUPTHER

## 2020-08-26 RX ORDER — ATORVASTATIN CALCIUM 40 MG/1
TABLET, FILM COATED ORAL
Qty: 100 TAB | Refills: 4 | Status: SHIPPED | OUTPATIENT
Start: 2020-08-26 | End: 2021-11-29 | Stop reason: SDUPTHER

## 2020-09-09 ENCOUNTER — APPOINTMENT (OUTPATIENT)
Dept: NEUROLOGY | Facility: MEDICAL CENTER | Age: 60
End: 2020-09-09
Payer: COMMERCIAL

## 2021-02-22 ENCOUNTER — PATIENT MESSAGE (OUTPATIENT)
Dept: MEDICAL GROUP | Facility: MEDICAL CENTER | Age: 61
End: 2021-02-22

## 2021-02-22 DIAGNOSIS — R63.4 UNINTENDED WEIGHT LOSS: ICD-10-CM

## 2021-02-25 ENCOUNTER — HOSPITAL ENCOUNTER (OUTPATIENT)
Dept: LAB | Facility: MEDICAL CENTER | Age: 61
End: 2021-02-25
Attending: FAMILY MEDICINE
Payer: COMMERCIAL

## 2021-02-25 DIAGNOSIS — R63.4 UNINTENDED WEIGHT LOSS: ICD-10-CM

## 2021-02-25 LAB
ALBUMIN SERPL BCP-MCNC: 4.7 G/DL (ref 3.2–4.9)
ALBUMIN/GLOB SERPL: 1.9 G/DL
ALP SERPL-CCNC: 88 U/L (ref 30–99)
ALT SERPL-CCNC: 26 U/L (ref 2–50)
ANION GAP SERPL CALC-SCNC: 7 MMOL/L (ref 7–16)
AST SERPL-CCNC: 20 U/L (ref 12–45)
BASOPHILS # BLD AUTO: 0.5 % (ref 0–1.8)
BASOPHILS # BLD: 0.03 K/UL (ref 0–0.12)
BILIRUB SERPL-MCNC: 1.1 MG/DL (ref 0.1–1.5)
BUN SERPL-MCNC: 10 MG/DL (ref 8–22)
CALCIUM SERPL-MCNC: 9.7 MG/DL (ref 8.5–10.5)
CHLORIDE SERPL-SCNC: 103 MMOL/L (ref 96–112)
CO2 SERPL-SCNC: 30 MMOL/L (ref 20–33)
CREAT SERPL-MCNC: 0.64 MG/DL (ref 0.5–1.4)
CRP SERPL HS-MCNC: 0.08 MG/DL (ref 0–0.75)
EOSINOPHIL # BLD AUTO: 0.05 K/UL (ref 0–0.51)
EOSINOPHIL NFR BLD: 0.9 % (ref 0–6.9)
ERYTHROCYTE [DISTWIDTH] IN BLOOD BY AUTOMATED COUNT: 46.6 FL (ref 35.9–50)
ERYTHROCYTE [SEDIMENTATION RATE] IN BLOOD BY WESTERGREN METHOD: <1 MM/HOUR (ref 0–20)
GLOBULIN SER CALC-MCNC: 2.5 G/DL (ref 1.9–3.5)
GLUCOSE SERPL-MCNC: 94 MG/DL (ref 65–99)
HCT VFR BLD AUTO: 49.8 % (ref 42–52)
HCV AB SER QL: NORMAL
HGB BLD-MCNC: 16.2 G/DL (ref 14–18)
HIV 1+2 AB+HIV1 P24 AG SERPL QL IA: NORMAL
IMM GRANULOCYTES # BLD AUTO: 0.01 K/UL (ref 0–0.11)
IMM GRANULOCYTES NFR BLD AUTO: 0.2 % (ref 0–0.9)
LYMPHOCYTES # BLD AUTO: 1.09 K/UL (ref 1–4.8)
LYMPHOCYTES NFR BLD: 18.9 % (ref 22–41)
MCH RBC QN AUTO: 31.2 PG (ref 27–33)
MCHC RBC AUTO-ENTMCNC: 32.5 G/DL (ref 33.7–35.3)
MCV RBC AUTO: 96 FL (ref 81.4–97.8)
MONOCYTES # BLD AUTO: 0.45 K/UL (ref 0–0.85)
MONOCYTES NFR BLD AUTO: 7.8 % (ref 0–13.4)
NEUTROPHILS # BLD AUTO: 4.14 K/UL (ref 1.82–7.42)
NEUTROPHILS NFR BLD: 71.7 % (ref 44–72)
NRBC # BLD AUTO: 0 K/UL
NRBC BLD-RTO: 0 /100 WBC
PLATELET # BLD AUTO: 192 K/UL (ref 164–446)
PMV BLD AUTO: 10.8 FL (ref 9–12.9)
POTASSIUM SERPL-SCNC: 4.5 MMOL/L (ref 3.6–5.5)
PROT SERPL-MCNC: 7.2 G/DL (ref 6–8.2)
RBC # BLD AUTO: 5.19 M/UL (ref 4.7–6.1)
SODIUM SERPL-SCNC: 140 MMOL/L (ref 135–145)
TSH SERPL DL<=0.005 MIU/L-ACNC: 0.97 UIU/ML (ref 0.38–5.33)
WBC # BLD AUTO: 5.8 K/UL (ref 4.8–10.8)

## 2021-02-25 PROCEDURE — 80053 COMPREHEN METABOLIC PANEL: CPT

## 2021-02-25 PROCEDURE — 85025 COMPLETE CBC W/AUTO DIFF WBC: CPT

## 2021-02-25 PROCEDURE — 84443 ASSAY THYROID STIM HORMONE: CPT

## 2021-02-25 PROCEDURE — 86803 HEPATITIS C AB TEST: CPT

## 2021-02-25 PROCEDURE — 87389 HIV-1 AG W/HIV-1&-2 AB AG IA: CPT

## 2021-02-25 PROCEDURE — 85652 RBC SED RATE AUTOMATED: CPT

## 2021-02-25 PROCEDURE — 86140 C-REACTIVE PROTEIN: CPT

## 2021-02-25 PROCEDURE — 36415 COLL VENOUS BLD VENIPUNCTURE: CPT

## 2021-03-01 ENCOUNTER — OFFICE VISIT (OUTPATIENT)
Dept: MEDICAL GROUP | Facility: MEDICAL CENTER | Age: 61
End: 2021-03-01
Payer: COMMERCIAL

## 2021-03-01 VITALS
OXYGEN SATURATION: 97 % | HEIGHT: 71 IN | SYSTOLIC BLOOD PRESSURE: 120 MMHG | TEMPERATURE: 98.6 F | HEART RATE: 59 BPM | RESPIRATION RATE: 16 BRPM | BODY MASS INDEX: 24.69 KG/M2 | WEIGHT: 176.4 LBS | DIASTOLIC BLOOD PRESSURE: 72 MMHG

## 2021-03-01 DIAGNOSIS — H93.8X2 SENSATION OF PLUGGED EAR ON LEFT SIDE: ICD-10-CM

## 2021-03-01 DIAGNOSIS — Z23 NEED FOR VACCINATION: ICD-10-CM

## 2021-03-01 DIAGNOSIS — R63.4 UNINTENDED WEIGHT LOSS: ICD-10-CM

## 2021-03-01 PROBLEM — H93.8X9 PLUGGED FEELING IN EAR: Status: ACTIVE | Noted: 2021-03-01

## 2021-03-01 PROCEDURE — 99214 OFFICE O/P EST MOD 30 MIN: CPT | Mod: 25 | Performed by: FAMILY MEDICINE

## 2021-03-01 PROCEDURE — 90471 IMMUNIZATION ADMIN: CPT | Performed by: FAMILY MEDICINE

## 2021-03-01 PROCEDURE — 90715 TDAP VACCINE 7 YRS/> IM: CPT | Performed by: FAMILY MEDICINE

## 2021-03-01 ASSESSMENT — PATIENT HEALTH QUESTIONNAIRE - PHQ9: CLINICAL INTERPRETATION OF PHQ2 SCORE: 0

## 2021-03-01 ASSESSMENT — FIBROSIS 4 INDEX: FIB4 SCORE: 1.18

## 2021-03-01 NOTE — ASSESSMENT & PLAN NOTE
Patient describes some unintended weight loss lately.  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  He states that he does not have much of an appetite because of the stress and is only eating very little.  He denies fever, chills and night sweats.  Labs were reassuring and his cancer screenings up-to-date.

## 2021-03-01 NOTE — ASSESSMENT & PLAN NOTE
For the past few months the patient has experienced an intermittent wrestling in the left ear.  He feels like it is fluid.

## 2021-03-01 NOTE — PROGRESS NOTES
[facility]  Progress Note  Established Patient    Subjective:   David Houston is a 60 y.o. male here today with a chief complaint of weight loss. The patient is alone.     Unintended weight loss  Patient describes some unintended weight loss lately.  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  He states that he does not have much of an appetite because of the stress and is only eating very little.  He denies fever, chills and night sweats.  Labs were reassuring and his cancer screenings up-to-date.    Plugged feeling in ear  For the past few months the patient has experienced an intermittent wrestling in the left ear.  He feels like it is fluid.       Current Outpatient Medications on File Prior to Visit   Medication Sig Dispense Refill   • VITAMIN D, CHOLECALCIFEROL, PO Take 2,000 Int'l Units by mouth every day.     • lisinopril (PRINIVIL) 10 MG Tab Take 1 Tab by mouth every day. 90 Tab 4   • atorvastatin (LIPITOR) 40 MG Tab TAKE 1 TABLET DAILY 100 Tab 4   • fluticasone (FLONASE) 50 MCG/ACT nasal spray USE 1 TO 2 SPRAYS IN EACH NOSTRIL EVERY DAY 48 g 3   • TURMERIC PO Take 2,600 mg by mouth every day.     • Coenzyme Q10 (CO Q 10) 100 MG Cap Take 100 mg by mouth every day. Uviquonol     • Misc Natural Products (TART WILKERSON ADVANCED) Cap Take 1-2 Caps by mouth every day.     • Glucos-Chond-Hyal Ac-Ca Fructo (MOVE FREE [facility] ADVANCE) Tab Take 1 Tab by mouth every day.     • aspirin 81 MG tablet Take  by mouth. daily       No current facility-administered medications on file prior to visit.          Objective:     Vitals:    03/01/21 1033   BP: 120/72   BP Location: Left arm   Patient Position: Sitting   BP Cuff Size: Adult   Pulse: (!) 59   Resp: 16   Temp: 37 °C (98.6 °F)   TempSrc: Temporal   SpO2: 97%   Weight: 80 kg (176 lb 6.4 oz)   Height: 1.803 m (5'  "11\")       Physical Exam:  General: alert in no apparent distress.   ENMT: Tympanic Membranes intact bilaterally.  Cardio: Regular rate and rhythm, no murmurs, rubs or gallops.  Respiratory: Clear to auscultation bilaterally.  Abdomen: Soft, nontender, nondistended.        Assessment and Plan:     1. Need for vaccination  - TDAP VACCINE =>8YO IM    2. Unintended weight loss  Patient's examination is normal and he has no B symptoms.  Labs are reassuring and his cancer screening is up-to-date.  I believe this is related to his stress.  I recommended healthy weight gain and I will see him in 4 weeks to monitor.  I offered him support resources but he declined.    3. Sensation of plugged ear on left side  Examination normal.  Suspect eustachian tube dysfunction and so I recommended Flonase at night and Claritin in the morning.  In the event this is complicated by otitis externa, will also prescribe Cortisporin.  - neomycin sulf/polymyx B sulf/HC soln (CORTISPORIN HC SOL) 3.5-43453-3 Solution; Administer 3 Drops into the left ear 3 times a day.  Dispense: 10 mL; Refill: 0        Followup: Return in about 4 weeks (around 3/29/2021), or if symptoms worsen or fail to improve.         "

## 2021-03-15 DIAGNOSIS — Z23 NEED FOR VACCINATION: ICD-10-CM

## 2021-03-23 PROCEDURE — 91300 PFIZER SARS-COV-2 VACCINE: CPT

## 2021-03-23 PROCEDURE — 0001A PFIZER SARS-COV-2 VACCINE: CPT

## 2021-03-24 ENCOUNTER — IMMUNIZATION (OUTPATIENT)
Dept: FAMILY PLANNING/WOMEN'S HEALTH CLINIC | Facility: IMMUNIZATION CENTER | Age: 61
End: 2021-03-24
Attending: INTERNAL MEDICINE
Payer: COMMERCIAL

## 2021-03-24 DIAGNOSIS — Z23 NEED FOR VACCINATION: ICD-10-CM

## 2021-03-24 DIAGNOSIS — Z23 ENCOUNTER FOR VACCINATION: Primary | ICD-10-CM

## 2021-03-29 ENCOUNTER — OFFICE VISIT (OUTPATIENT)
Dept: MEDICAL GROUP | Facility: MEDICAL CENTER | Age: 61
End: 2021-03-29
Payer: COMMERCIAL

## 2021-03-29 VITALS
TEMPERATURE: 97.9 F | RESPIRATION RATE: 20 BRPM | WEIGHT: 180 LBS | SYSTOLIC BLOOD PRESSURE: 118 MMHG | BODY MASS INDEX: 25.2 KG/M2 | HEIGHT: 71 IN | OXYGEN SATURATION: 95 % | HEART RATE: 64 BPM | DIASTOLIC BLOOD PRESSURE: 64 MMHG

## 2021-03-29 DIAGNOSIS — E78.5 DYSLIPIDEMIA: ICD-10-CM

## 2021-03-29 DIAGNOSIS — H93.8X2 SENSATION OF PLUGGED EAR ON LEFT SIDE: ICD-10-CM

## 2021-03-29 DIAGNOSIS — Z12.5 SCREENING FOR PROSTATE CANCER: ICD-10-CM

## 2021-03-29 DIAGNOSIS — R63.4 UNINTENDED WEIGHT LOSS: ICD-10-CM

## 2021-03-29 PROCEDURE — 99214 OFFICE O/P EST MOD 30 MIN: CPT | Performed by: FAMILY MEDICINE

## 2021-03-29 ASSESSMENT — FIBROSIS 4 INDEX: FIB4 SCORE: 1.18

## 2021-03-29 NOTE — PROGRESS NOTES
"[facility]  Progress Note  Established Patient    Subjective:   David Houston is a 60 y.o. male here today with a chief complaint of weight loss. The patient is alone.     Dyslipidemia  Continues on atorvastatin.    Unintended weight loss  Improved. No fever, chills or night sweats.     Plugged feeling in ear  Improved, never did get the medication prescribed.      Current Outpatient Medications on File Prior to Visit   Medication Sig Dispense Refill   • VITAMIN D, CHOLECALCIFEROL, PO Take 2,000 Int'l Units by mouth every day.     • lisinopril (PRINIVIL) 10 MG Tab Take 1 Tab by mouth every day. 90 Tab 4   • atorvastatin (LIPITOR) 40 MG Tab TAKE 1 TABLET DAILY 100 Tab 4   • fluticasone (FLONASE) 50 MCG/ACT nasal spray USE 1 TO 2 SPRAYS IN EACH NOSTRIL EVERY DAY 48 g 3   • TURMERIC PO Take 2,600 mg by mouth every day.     • Coenzyme Q10 (CO Q 10) 100 MG Cap Take 100 mg by mouth every day. Uviquonol     • Misc Natural Products (TART WILKERSON ADVANCED) Cap Take 1-2 Caps by mouth every day.     • Glucos-Chond-Hyal Ac-Ca Fructo (MOVE FREE [employer] ADVANCE) Tab Take 1 Tab by mouth every day.     • aspirin 81 MG tablet Take  by mouth. daily       No current facility-administered medications on file prior to visit.          Objective:     Vitals:    03/29/21 1356   BP: 118/64   BP Location: Left arm   Patient Position: Sitting   BP Cuff Size: Adult long   Pulse: 64   Resp: 20   Temp: 36.6 °C (97.9 °F)   TempSrc: Temporal   SpO2: 95%   Weight: 81.6 kg (180 lb)   Height: 1.803 m (5' 11\")       Physical Exam:  General: alert in no apparent distress.   ENMT: TM intact bilaterally.         Assessment and Plan:     1. Screening for prostate cancer  - PROSTATE SPECIFIC AG SCREENING; Future    2. Dyslipidemia  - continue statin.   - Comp Metabolic Panel; Future  - Lipid Profile; Future    3. Sensation of plugged ear on left side  If recurs, use cortisporin.   - neomycin sulf/polymyx B sulf/HC soln (CORTISPORIN HC " SOL) 3.5-64186-1 Solution; Administer 3 Drops into the left ear 3 times a day.  Dispense: 10 mL; Refill: 0    4. Unintended weight loss  Improved. No B symptoms.        Followup: Return in about 4 months (around 7/29/2021), or if symptoms worsen or fail to improve, for Wellness Visit, Long.

## 2021-04-15 ENCOUNTER — IMMUNIZATION (OUTPATIENT)
Dept: FAMILY PLANNING/WOMEN'S HEALTH CLINIC | Facility: IMMUNIZATION CENTER | Age: 61
End: 2021-04-15
Attending: INTERNAL MEDICINE
Payer: COMMERCIAL

## 2021-04-15 DIAGNOSIS — Z23 ENCOUNTER FOR VACCINATION: Primary | ICD-10-CM

## 2021-04-15 PROCEDURE — 91300 PFIZER SARS-COV-2 VACCINE: CPT

## 2021-04-15 PROCEDURE — 0002A PFIZER SARS-COV-2 VACCINE: CPT

## 2021-06-15 ENCOUNTER — OFFICE VISIT (OUTPATIENT)
Dept: MEDICAL GROUP | Facility: MEDICAL CENTER | Age: 61
End: 2021-06-15
Payer: COMMERCIAL

## 2021-06-15 VITALS
OXYGEN SATURATION: 96 % | HEIGHT: 70 IN | BODY MASS INDEX: 25.88 KG/M2 | RESPIRATION RATE: 18 BRPM | DIASTOLIC BLOOD PRESSURE: 60 MMHG | WEIGHT: 180.8 LBS | SYSTOLIC BLOOD PRESSURE: 104 MMHG | TEMPERATURE: 98.4 F | HEART RATE: 64 BPM

## 2021-06-15 DIAGNOSIS — I10 ESSENTIAL HYPERTENSION: ICD-10-CM

## 2021-06-15 DIAGNOSIS — R27.0 ATAXIA: ICD-10-CM

## 2021-06-15 DIAGNOSIS — E78.5 DYSLIPIDEMIA: ICD-10-CM

## 2021-06-15 DIAGNOSIS — Z00.00 HEALTHCARE MAINTENANCE: ICD-10-CM

## 2021-06-15 PROBLEM — R63.4 UNINTENDED WEIGHT LOSS: Status: RESOLVED | Noted: 2021-03-01 | Resolved: 2021-06-15

## 2021-06-15 PROCEDURE — 99396 PREV VISIT EST AGE 40-64: CPT | Performed by: FAMILY MEDICINE

## 2021-06-15 ASSESSMENT — FIBROSIS 4 INDEX: FIB4 SCORE: 1.18

## 2021-06-15 NOTE — ASSESSMENT & PLAN NOTE
The patient has a fairly longstanding history of ataxia.  He has seen a number of different subspecialist and had neuroimaging for this issue.  The last neurologist he saw suggested a possible genetic component and recommended physical therapy and consideration of seeing a tertiary care center.  At one point he was scheduled to see Dr. Hagen, one of our neurologists. The patient cancelled this appt and did not reschedule.

## 2021-06-15 NOTE — PROGRESS NOTES
[facility]  Progress Note  Established Patient    Subjective:   David Houston is a 60 y.o. male here today for a wellness visit. The patient is alone.     Ataxia  The patient has a fairly longstanding history of ataxia.  He has seen a number of different subspecialist and had neuroimaging for this issue.  The last neurologist he saw suggested a possible genetic component and recommended physical therapy and consideration of seeing a tertiary care center.  At one point he was scheduled to see Dr. Hagen, one of our neurologists. The patient cancelled this appt and did not reschedule.    Dyslipidemia  Controlled with atorvastatin.    Essential hypertension  At goal, no orthostasis.    Healthcare maintenance  Lipids: will be getting soon.   Fasting Glucose: will be getting soon.  Hepatitis C Screen: done 2018 and normal.   Colonoscopy: done 2018, single tubular adenoma.   PSA: will be getting soon.     Tdap: given 2021.   Shingrix: recommended, patient will get at pharmacy.       Current Outpatient Medications on File Prior to Visit   Medication Sig Dispense Refill   • neomycin sulf/polymyx B sulf/HC soln (CORTISPORIN HC SOL) 3.5-31594-0 Solution Administer 3 Drops into the left ear 3 times a day. 10 mL 0   • VITAMIN D, CHOLECALCIFEROL, PO Take 2,000 Int'l Units by mouth every day.     • lisinopril (PRINIVIL) 10 MG Tab Take 1 Tab by mouth every day. 90 Tab 4   • atorvastatin (LIPITOR) 40 MG Tab TAKE 1 TABLET DAILY 100 Tab 4   • fluticasone (FLONASE) 50 MCG/ACT nasal spray USE 1 TO 2 SPRAYS IN EACH NOSTRIL EVERY DAY 48 g 3   • TURMERIC PO Take 2,600 mg by mouth every day.     • Coenzyme Q10 (CO Q 10) 100 MG Cap Take 100 mg by mouth every day. Uviquonol     • Misc Natural Products (TART WILKERSON ADVANCED) Cap Take 1-2 Caps by mouth every day.     • Glucos-Chond-Hyal Ac-Ca Fructo (MOVE FREE [employer] [facility] ADVANCE) Tab Take 1 Tab by mouth every day.     • aspirin 81 MG tablet Take  by mouth. daily       No  "current facility-administered medications on file prior to visit.          Objective:     Vitals:    06/15/21 1304   BP: 104/60   BP Location: Left arm   Patient Position: Sitting   BP Cuff Size: Adult long   Pulse: 64   Resp: 18   Temp: 36.9 °C (98.4 °F)   TempSrc: Temporal   SpO2: 96%   Weight: 82 kg (180 lb 12.8 oz)   Height: 1.778 m (5' 10\")       Physical Exam:  General: alert in no apparent distress.         Assessment and Plan:     1. Dyslipidemia  - continue statin.     2. Essential hypertension  - continue ACE.     3. Healthcare maintenance  - see HPI.   - discussed diet.   - age appropriate anticipatory guidance discussed.     4. Ataxia  - recommended re-establishing with neurology. Patient declines for now.         Followup: Return in about 6 months (around 12/15/2021), or if symptoms worsen or fail to improve.         "

## 2021-06-15 NOTE — ASSESSMENT & PLAN NOTE
Lipids: will be getting soon.   Fasting Glucose: will be getting soon.  Hepatitis C Screen: done 2018 and normal.   Colonoscopy: done 2018, single tubular adenoma.   PSA: will be getting soon.     Tdap: given 2021.   Shingrix: recommended, patient will get at pharmacy.

## 2021-06-22 ENCOUNTER — HOSPITAL ENCOUNTER (OUTPATIENT)
Dept: LAB | Facility: MEDICAL CENTER | Age: 61
End: 2021-06-22
Attending: FAMILY MEDICINE
Payer: COMMERCIAL

## 2021-06-22 DIAGNOSIS — E78.5 DYSLIPIDEMIA: ICD-10-CM

## 2021-06-22 DIAGNOSIS — Z12.5 SCREENING FOR PROSTATE CANCER: ICD-10-CM

## 2021-06-22 LAB
ALBUMIN SERPL BCP-MCNC: 4.4 G/DL (ref 3.2–4.9)
ALBUMIN/GLOB SERPL: 1.7 G/DL
ALP SERPL-CCNC: 87 U/L (ref 30–99)
ALT SERPL-CCNC: 20 U/L (ref 2–50)
ANION GAP SERPL CALC-SCNC: 10 MMOL/L (ref 7–16)
AST SERPL-CCNC: 20 U/L (ref 12–45)
BILIRUB SERPL-MCNC: 1.4 MG/DL (ref 0.1–1.5)
BUN SERPL-MCNC: 11 MG/DL (ref 8–22)
CALCIUM SERPL-MCNC: 9.2 MG/DL (ref 8.5–10.5)
CHLORIDE SERPL-SCNC: 103 MMOL/L (ref 96–112)
CHOLEST SERPL-MCNC: 156 MG/DL (ref 100–199)
CO2 SERPL-SCNC: 27 MMOL/L (ref 20–33)
CREAT SERPL-MCNC: 0.76 MG/DL (ref 0.5–1.4)
FASTING STATUS PATIENT QL REPORTED: NORMAL
GLOBULIN SER CALC-MCNC: 2.6 G/DL (ref 1.9–3.5)
GLUCOSE SERPL-MCNC: 85 MG/DL (ref 65–99)
HDLC SERPL-MCNC: 48 MG/DL
LDLC SERPL CALC-MCNC: 94 MG/DL
POTASSIUM SERPL-SCNC: 4 MMOL/L (ref 3.6–5.5)
PROT SERPL-MCNC: 7 G/DL (ref 6–8.2)
PSA SERPL-MCNC: 13 NG/ML (ref 0–4)
SODIUM SERPL-SCNC: 140 MMOL/L (ref 135–145)
TRIGL SERPL-MCNC: 71 MG/DL (ref 0–149)

## 2021-06-22 PROCEDURE — 80053 COMPREHEN METABOLIC PANEL: CPT

## 2021-06-22 PROCEDURE — 36415 COLL VENOUS BLD VENIPUNCTURE: CPT

## 2021-06-22 PROCEDURE — 80061 LIPID PANEL: CPT

## 2021-06-22 PROCEDURE — 84153 ASSAY OF PSA TOTAL: CPT

## 2021-06-23 DIAGNOSIS — R97.20 ELEVATED PSA: ICD-10-CM

## 2021-07-14 ENCOUNTER — HOSPITAL ENCOUNTER (OUTPATIENT)
Dept: LAB | Facility: MEDICAL CENTER | Age: 61
End: 2021-07-14
Attending: FAMILY MEDICINE
Payer: COMMERCIAL

## 2021-07-14 DIAGNOSIS — R97.20 ELEVATED PSA: ICD-10-CM

## 2021-07-14 LAB — PSA SERPL-MCNC: 5.99 NG/ML (ref 0–4)

## 2021-07-14 PROCEDURE — 36415 COLL VENOUS BLD VENIPUNCTURE: CPT

## 2021-07-14 PROCEDURE — 84153 ASSAY OF PSA TOTAL: CPT

## 2021-07-15 DIAGNOSIS — R97.20 ELEVATED PSA: ICD-10-CM

## 2021-08-17 ENCOUNTER — HOSPITAL ENCOUNTER (OUTPATIENT)
Dept: LAB | Facility: MEDICAL CENTER | Age: 61
End: 2021-08-17
Attending: FAMILY MEDICINE
Payer: COMMERCIAL

## 2021-08-17 DIAGNOSIS — R97.20 ELEVATED PSA: ICD-10-CM

## 2021-08-17 LAB — PSA SERPL-MCNC: 0.92 NG/ML (ref 0–4)

## 2021-08-17 PROCEDURE — 84153 ASSAY OF PSA TOTAL: CPT

## 2021-08-17 PROCEDURE — 36415 COLL VENOUS BLD VENIPUNCTURE: CPT

## 2021-08-18 DIAGNOSIS — R97.20 ELEVATED PSA: ICD-10-CM

## 2021-10-05 DIAGNOSIS — J30.1 SEASONAL ALLERGIC RHINITIS DUE TO POLLEN: ICD-10-CM

## 2021-10-05 RX ORDER — FLUTICASONE PROPIONATE 50 MCG
SPRAY, SUSPENSION (ML) NASAL
Qty: 48 G | Refills: 3 | Status: SHIPPED | OUTPATIENT
Start: 2021-10-05 | End: 2022-12-16 | Stop reason: SDUPTHER

## 2021-11-08 RX ORDER — LISINOPRIL 10 MG/1
10 TABLET ORAL DAILY
Qty: 100 TABLET | Refills: 4 | Status: SHIPPED | OUTPATIENT
Start: 2021-11-08 | End: 2022-06-13

## 2021-11-12 ENCOUNTER — HOSPITAL ENCOUNTER (OUTPATIENT)
Dept: LAB | Facility: MEDICAL CENTER | Age: 61
End: 2021-11-12
Attending: FAMILY MEDICINE
Payer: COMMERCIAL

## 2021-11-12 DIAGNOSIS — R97.20 ELEVATED PSA: ICD-10-CM

## 2021-11-12 LAB — PSA SERPL-MCNC: 1.09 NG/ML (ref 0–4)

## 2021-11-12 PROCEDURE — 36415 COLL VENOUS BLD VENIPUNCTURE: CPT

## 2021-11-12 PROCEDURE — 84153 ASSAY OF PSA TOTAL: CPT

## 2021-11-29 ENCOUNTER — PATIENT MESSAGE (OUTPATIENT)
Dept: MEDICAL GROUP | Facility: MEDICAL CENTER | Age: 61
End: 2021-11-29

## 2021-11-29 RX ORDER — ATORVASTATIN CALCIUM 40 MG/1
TABLET, FILM COATED ORAL
Qty: 100 TABLET | Refills: 4 | Status: SHIPPED | OUTPATIENT
Start: 2021-11-29 | End: 2023-01-13 | Stop reason: SDUPTHER

## 2021-11-29 NOTE — TELEPHONE ENCOUNTER
From: David Houston  To: Physician Vickey Watson  Sent: 11/29/2021 2:17 PM PST  Subject: My Atorvastatin    I keep having difficulties with my mail order pharmacy ([facility] I requested new Atorvastatin refills last week over the holiday. Over the weekend they texted me that they had “not heard back yet” and that I need to contact my Dr. I just figured that since it was over the holiday it might take a little longer. Is am NOT out of pills. FYI. Thank You.

## 2021-12-14 ENCOUNTER — OFFICE VISIT (OUTPATIENT)
Dept: MEDICAL GROUP | Facility: MEDICAL CENTER | Age: 61
End: 2021-12-14
Payer: COMMERCIAL

## 2021-12-14 VITALS
BODY MASS INDEX: 27.32 KG/M2 | HEIGHT: 70 IN | TEMPERATURE: 97.9 F | WEIGHT: 190.81 LBS | OXYGEN SATURATION: 96 % | DIASTOLIC BLOOD PRESSURE: 64 MMHG | SYSTOLIC BLOOD PRESSURE: 104 MMHG | RESPIRATION RATE: 18 BRPM | HEART RATE: 56 BPM

## 2021-12-14 DIAGNOSIS — R97.20 ELEVATED PSA: ICD-10-CM

## 2021-12-14 DIAGNOSIS — Z23 NEED FOR VACCINATION: ICD-10-CM

## 2021-12-14 DIAGNOSIS — I10 ESSENTIAL HYPERTENSION: ICD-10-CM

## 2021-12-14 DIAGNOSIS — L98.9 SKIN LESION: ICD-10-CM

## 2021-12-14 DIAGNOSIS — E78.5 DYSLIPIDEMIA: ICD-10-CM

## 2021-12-14 PROBLEM — H93.8X9 PLUGGED FEELING IN EAR: Status: RESOLVED | Noted: 2021-03-01 | Resolved: 2021-12-14

## 2021-12-14 PROCEDURE — 90471 IMMUNIZATION ADMIN: CPT | Performed by: FAMILY MEDICINE

## 2021-12-14 PROCEDURE — 90750 HZV VACC RECOMBINANT IM: CPT | Performed by: FAMILY MEDICINE

## 2021-12-14 PROCEDURE — 90472 IMMUNIZATION ADMIN EACH ADD: CPT | Performed by: FAMILY MEDICINE

## 2021-12-14 PROCEDURE — 99214 OFFICE O/P EST MOD 30 MIN: CPT | Mod: 25 | Performed by: FAMILY MEDICINE

## 2021-12-14 PROCEDURE — 90686 IIV4 VACC NO PRSV 0.5 ML IM: CPT | Performed by: FAMILY MEDICINE

## 2021-12-14 RX ORDER — TRIAMCINOLONE ACETONIDE 1 MG/G
OINTMENT TOPICAL
Qty: 15 G | Refills: 0 | Status: SHIPPED | OUTPATIENT
Start: 2021-12-14 | End: 2021-12-14 | Stop reason: SDUPTHER

## 2021-12-14 RX ORDER — TRIAMCINOLONE ACETONIDE 1 MG/G
OINTMENT TOPICAL
Qty: 15 G | Refills: 0 | Status: SHIPPED
Start: 2021-12-14 | End: 2022-06-13

## 2021-12-14 ASSESSMENT — FIBROSIS 4 INDEX: FIB4 SCORE: 1.37

## 2021-12-14 NOTE — PROGRESS NOTES
"[facility]  Progress Note  Established Patient    Subjective:   David Houston is a 61 y.o. male here today with a chief complaint of skin lesion. The patient is alone.     Skin lesion  Patient describes a flaky skin lesion on the right external ear.    Elevated PSA  Improved on recheck.    Essential hypertension  At goal.  Denies worsening dizziness or lightheadedness.    Dyslipidemia  Controlled with atorvastatin.      Current Outpatient Medications on File Prior to Visit   Medication Sig Dispense Refill   • MAGNESIUM MALATE PO Take 400 mg by mouth.     • LYCOPENE PO Take 2 Capsules by mouth every day.     • atorvastatin (LIPITOR) 40 MG Tab TAKE 1 TABLET DAILY 100 Tablet 4   • lisinopril (PRINIVIL) 10 MG Tab Take 1 Tablet by mouth every day. 100 Tablet 4   • fluticasone (FLONASE) 50 MCG/ACT nasal spray USE 1 TO 2 SPRAYS IN EACH NOSTRIL EVERY DAY 48 g 3   • VITAMIN D, CHOLECALCIFEROL, PO Take 2,000 Int'l Units by mouth every day.     • TURMERIC PO Take 2,600 mg by mouth every day.     • Coenzyme Q10 (CO Q 10) 100 MG Cap Take 100 mg by mouth every day. Uviquonol     • Misc Natural Products (TART WILKERSON ADVANCED) Cap Take 1-2 Caps by mouth every day.     • Glucos-Chond-Hyal Ac-Ca Fructo (MOVE FREE [facility] ADVANCE) Tab Take 1 Tab by mouth every day.     • aspirin 81 MG tablet Take  by mouth. daily       No current facility-administered medications on file prior to visit.          Objective:     Vitals:    12/14/21 1258   BP: 104/64   BP Location: Left arm   Patient Position: Sitting   BP Cuff Size: Adult long   Pulse: (!) 56   Resp: 18   Temp: 36.6 °C (97.9 °F)   TempSrc: Temporal   SpO2: 96%   Weight: 86.5 kg (190 lb 12.9 oz)   Height: 1.778 m (5' 10\")       Physical Exam:  General: alert in no apparent distress.   Ears: R external ear flaky skin lesion.         Assessment and Plan:     1. Need for vaccination  - INFLUENZA VACCINE QUAD INJ (PF)  - Shingles Vaccine (Shingrix)    2. Essential " hypertension  - continue current regimen.     3. Dyslipidemia  - continue statin.     4. Elevated PSA  - repeat PSA in f/u, improved on recent check.     5. Skin lesion  Suspect eczema. Will try TAC, if no improvement consider cryo for AK.  - triamcinolone acetonide (KENALOG) 0.1 % Ointment; Apply a thin layer to rash once daily x 7 days.  Dispense: 15 g; Refill: 0        Followup: Return in about 6 months (around 6/14/2022), or if symptoms worsen or fail to improve, for Wellness Visit, Long.

## 2022-06-12 SDOH — HEALTH STABILITY: PHYSICAL HEALTH: ON AVERAGE, HOW MANY DAYS PER WEEK DO YOU ENGAGE IN MODERATE TO STRENUOUS EXERCISE (LIKE A BRISK WALK)?: 0 DAYS

## 2022-06-12 SDOH — ECONOMIC STABILITY: FOOD INSECURITY: WITHIN THE PAST 12 MONTHS, THE FOOD YOU BOUGHT JUST DIDN'T LAST AND YOU DIDN'T HAVE MONEY TO GET MORE.: NEVER TRUE

## 2022-06-12 SDOH — ECONOMIC STABILITY: TRANSPORTATION INSECURITY
IN THE PAST 12 MONTHS, HAS THE LACK OF TRANSPORTATION KEPT YOU FROM MEDICAL APPOINTMENTS OR FROM GETTING MEDICATIONS?: NO

## 2022-06-12 SDOH — ECONOMIC STABILITY: HOUSING INSECURITY
IN THE LAST 12 MONTHS, WAS THERE A TIME WHEN YOU DID NOT HAVE A STEADY PLACE TO SLEEP OR SLEPT IN A SHELTER (INCLUDING NOW)?: NO

## 2022-06-12 SDOH — HEALTH STABILITY: MENTAL HEALTH
STRESS IS WHEN SOMEONE FEELS TENSE, NERVOUS, ANXIOUS, OR CAN'T SLEEP AT NIGHT BECAUSE THEIR MIND IS TROUBLED. HOW STRESSED ARE YOU?: TO SOME EXTENT

## 2022-06-12 SDOH — ECONOMIC STABILITY: INCOME INSECURITY: IN THE LAST 12 MONTHS, WAS THERE A TIME WHEN YOU WERE NOT ABLE TO PAY THE MORTGAGE OR RENT ON TIME?: NO

## 2022-06-12 SDOH — ECONOMIC STABILITY: TRANSPORTATION INSECURITY
IN THE PAST 12 MONTHS, HAS LACK OF TRANSPORTATION KEPT YOU FROM MEETINGS, WORK, OR FROM GETTING THINGS NEEDED FOR DAILY LIVING?: NO

## 2022-06-12 SDOH — ECONOMIC STABILITY: INCOME INSECURITY: HOW HARD IS IT FOR YOU TO PAY FOR THE VERY BASICS LIKE FOOD, HOUSING, MEDICAL CARE, AND HEATING?: NOT VERY HARD

## 2022-06-12 SDOH — ECONOMIC STABILITY: FOOD INSECURITY: WITHIN THE PAST 12 MONTHS, YOU WORRIED THAT YOUR FOOD WOULD RUN OUT BEFORE YOU GOT MONEY TO BUY MORE.: NEVER TRUE

## 2022-06-12 SDOH — HEALTH STABILITY: PHYSICAL HEALTH: ON AVERAGE, HOW MANY MINUTES DO YOU ENGAGE IN EXERCISE AT THIS LEVEL?: 10 MIN

## 2022-06-12 SDOH — ECONOMIC STABILITY: HOUSING INSECURITY

## 2022-06-12 SDOH — ECONOMIC STABILITY: TRANSPORTATION INSECURITY
IN THE PAST 12 MONTHS, HAS LACK OF RELIABLE TRANSPORTATION KEPT YOU FROM MEDICAL APPOINTMENTS, MEETINGS, WORK OR FROM GETTING THINGS NEEDED FOR DAILY LIVING?: NO

## 2022-06-12 ASSESSMENT — SOCIAL DETERMINANTS OF HEALTH (SDOH)
IN A TYPICAL WEEK, HOW MANY TIMES DO YOU TALK ON THE PHONE WITH FAMILY, FRIENDS, OR NEIGHBORS?: MORE THAN THREE TIMES A WEEK
ARE YOU MARRIED, WIDOWED, DIVORCED, SEPARATED, NEVER MARRIED, OR LIVING WITH A PARTNER?: NEVER MARRIED
DO YOU BELONG TO ANY CLUBS OR ORGANIZATIONS SUCH AS CHURCH GROUPS UNIONS, FRATERNAL OR ATHLETIC GROUPS, OR SCHOOL GROUPS?: YES
WITHIN THE PAST 12 MONTHS, YOU WORRIED THAT YOUR FOOD WOULD RUN OUT BEFORE YOU GOT THE MONEY TO BUY MORE: NEVER TRUE
HOW OFTEN DO YOU ATTENT MEETINGS OF THE CLUB OR ORGANIZATION YOU BELONG TO?: 1 TO 4 TIMES PER YEAR
HOW OFTEN DO YOU HAVE SIX OR MORE DRINKS ON ONE OCCASION: NEVER
HOW OFTEN DO YOU GET TOGETHER WITH FRIENDS OR RELATIVES?: ONCE A WEEK
HOW OFTEN DO YOU ATTENT MEETINGS OF THE CLUB OR ORGANIZATION YOU BELONG TO?: 1 TO 4 TIMES PER YEAR
IN A TYPICAL WEEK, HOW MANY TIMES DO YOU TALK ON THE PHONE WITH FAMILY, FRIENDS, OR NEIGHBORS?: MORE THAN THREE TIMES A WEEK
HOW OFTEN DO YOU ATTEND CHURCH OR RELIGIOUS SERVICES?: NEVER
HOW MANY DRINKS CONTAINING ALCOHOL DO YOU HAVE ON A TYPICAL DAY WHEN YOU ARE DRINKING: 1 OR 2
HOW OFTEN DO YOU ATTEND CHURCH OR RELIGIOUS SERVICES?: NEVER
HOW OFTEN DO YOU HAVE A DRINK CONTAINING ALCOHOL: 2-3 TIMES A WEEK
ARE YOU MARRIED, WIDOWED, DIVORCED, SEPARATED, NEVER MARRIED, OR LIVING WITH A PARTNER?: NEVER MARRIED
HOW OFTEN DO YOU GET TOGETHER WITH FRIENDS OR RELATIVES?: ONCE A WEEK
DO YOU BELONG TO ANY CLUBS OR ORGANIZATIONS SUCH AS CHURCH GROUPS UNIONS, FRATERNAL OR ATHLETIC GROUPS, OR SCHOOL GROUPS?: YES
HOW HARD IS IT FOR YOU TO PAY FOR THE VERY BASICS LIKE FOOD, HOUSING, MEDICAL CARE, AND HEATING?: NOT VERY HARD

## 2022-06-12 ASSESSMENT — LIFESTYLE VARIABLES
HOW OFTEN DO YOU HAVE SIX OR MORE DRINKS ON ONE OCCASION: NEVER
HOW MANY STANDARD DRINKS CONTAINING ALCOHOL DO YOU HAVE ON A TYPICAL DAY: 1 OR 2
AUDIT-C TOTAL SCORE: 3
HOW OFTEN DO YOU HAVE A DRINK CONTAINING ALCOHOL: 2-3 TIMES A WEEK
SKIP TO QUESTIONS 9-10: 1

## 2022-06-13 ENCOUNTER — OFFICE VISIT (OUTPATIENT)
Dept: MEDICAL GROUP | Facility: MEDICAL CENTER | Age: 62
End: 2022-06-13
Payer: COMMERCIAL

## 2022-06-13 VITALS
BODY MASS INDEX: 28.88 KG/M2 | RESPIRATION RATE: 18 BRPM | TEMPERATURE: 97.9 F | SYSTOLIC BLOOD PRESSURE: 124 MMHG | HEART RATE: 60 BPM | WEIGHT: 201.72 LBS | DIASTOLIC BLOOD PRESSURE: 70 MMHG | HEIGHT: 70 IN | OXYGEN SATURATION: 96 %

## 2022-06-13 DIAGNOSIS — Z00.00 HEALTHCARE MAINTENANCE: ICD-10-CM

## 2022-06-13 DIAGNOSIS — I10 ESSENTIAL HYPERTENSION: ICD-10-CM

## 2022-06-13 DIAGNOSIS — R27.0 ATAXIA: ICD-10-CM

## 2022-06-13 PROBLEM — R97.20 ELEVATED PSA: Status: RESOLVED | Noted: 2021-12-14 | Resolved: 2022-06-13

## 2022-06-13 PROCEDURE — 99396 PREV VISIT EST AGE 40-64: CPT | Performed by: FAMILY MEDICINE

## 2022-06-13 ASSESSMENT — PATIENT HEALTH QUESTIONNAIRE - PHQ9: CLINICAL INTERPRETATION OF PHQ2 SCORE: 0

## 2022-06-13 ASSESSMENT — FIBROSIS 4 INDEX: FIB4 SCORE: 1.37

## 2022-06-13 NOTE — ASSESSMENT & PLAN NOTE
Lipids: ordered.   Fasting Glucose: ordered.  Hepatitis C Screen: done 2018 and normal.   Colonoscopy: done 2018, single tubular adenoma.   PSA: ordered.    Tdap: given 2021.

## 2022-06-13 NOTE — ASSESSMENT & PLAN NOTE
BP at goal. The patient wonders that his balance issues, discussed elsewhere, may be impacted by his ACE and is interested in coming off this medicine.

## 2022-06-13 NOTE — PROGRESS NOTES
[facility]  Progress Note  Established Patient    Subjective:   David Houston is a 61 y.o. male here today for a wellness visit. The patient is alone.     Ataxia  The patient has a fairly longstanding history of ataxia.  He has seen a number of different subspecialist and had neuroimaging for this issue.  The last neurologist he saw suggested a possible genetic component and recommended physical therapy and consideration of seeing a tertiary care center.  At one point he was scheduled to see Dr. Hagen, one of our neurologists. The patient cancelled this appt and did not reschedule.    Essential hypertension  BP at goal. The patient wonders that his balance issues, discussed elsewhere, may be impacted by his ACE and is interested in coming off this medicine.    Healthcare maintenance  Lipids: ordered.   Fasting Glucose: ordered.  Hepatitis C Screen: done 2018 and normal.   Colonoscopy: done 2018, single tubular adenoma.   PSA: ordered.    Tdap: given 2021.       Current Outpatient Medications on File Prior to Visit   Medication Sig Dispense Refill   • MAGNESIUM MALATE PO Take 400 mg by mouth.     • LYCOPENE PO Take 2 Capsules by mouth every day.     • atorvastatin (LIPITOR) 40 MG Tab TAKE 1 TABLET DAILY 100 Tablet 4   • fluticasone (FLONASE) 50 MCG/ACT nasal spray USE 1 TO 2 SPRAYS IN EACH NOSTRIL EVERY DAY 48 g 3   • VITAMIN D, CHOLECALCIFEROL, PO Take 2,000 Int'l Units by mouth every day.     • TURMERIC PO Take 2,600 mg by mouth every day.     • Coenzyme Q10 (CO Q 10) 100 MG Cap Take 100 mg by mouth every day. Uviquonol     • Misc Natural Products (TART WILKERSON ADVANCED) Cap Take 1-2 Caps by mouth every day.     • Glucos-Chond-Hyal Ac-Ca Fructo (MOVE FREE [facility] ADVANCE) Tab Take 1 Tab by mouth every day.     • aspirin 81 MG tablet Take  by mouth. daily       No current facility-administered medications on file prior to visit.          Objective:     Vitals:    06/13/22 1301   BP: 124/70   BP  "Location: Left arm   Patient Position: Sitting   BP Cuff Size: Adult long   Pulse: 60   Resp: 18   Temp: 36.6 °C (97.9 °F)   TempSrc: Temporal   SpO2: 96%   Weight: 91.5 kg (201 lb 11.5 oz)   Height: 1.778 m (5' 10\")       Physical Exam:  General: alert in no apparent distress.   Cardio: RRR.   Resp: CTAB.         Assessment and Plan:     1. Essential hypertension  I don't believe the BP medication is contributing to his balance concerns but we can do a trial of discontinuation.   - stop ACE.   - check BP at home daily, call if SBP > 140.  - f/u 7-10 days.     2. Healthcare maintenance  - see HPI.   -Age-appropriate anticipatory guidance discussed.  - Comp Metabolic Panel; Future  - Lipid Profile; Future  - PROSTATE SPECIFIC AG SCREENING; Future    3. Ataxia  Advised him to f/u neuro.   - Referral to Neurology        Followup: Return in about 1 week (around 6/20/2022).         "

## 2022-06-13 NOTE — PATIENT INSTRUCTIONS
Vickey Watson MD will not be returning to work with [facility] as of August 01, 2022. If you'd like to see Dr. Watson before he leaves, please call [phone] to schedule an appointment.      You will need to establish with a new primary care provider as soon as possible. During this transition period, we will work hard to ensure there is no lapse in your care. To assist in this transition, please review these frequently asked questions.    How do I transition to a new provider?  Our scheduling team is aware of these changes and is available to help you establish with a new provider who is accepting new patients within [facility]. Please call [phone] to schedule an appointment. Our website also features profiles of our providers at [email or web address] for you to review.    Where do I go for necessary medication refills or referrals during this transition?  Please schedule an appointment to establish with a new provider as soon as possible. Before that scheduled appointment, the medical providers at our location will be available to assist with any urgent medication refills and/or referrals. Depending on the nature of your request, an in-person appointment may be required for further discussion. Please continue to call our office to speak with one of our wonderful medical assistants or send your requests via your Socialtext account at [email or web address] and we will be happy to assist you in identifying the appropriate course of action.     How do I obtain copies of my medical records?  If you choose to remain with [facility], your medical records are already available to your new provider through [facility]'s electronic medical record system, and no further action is needed. Should you choose to receive care with a provider outside [facility], you may acquire copies of your records by visiting [email or web address] or by calling [phone].    What do I do in the case of an  emergency?  In case of any emergency, please visit the nearest Emergency Room or call 911.    For any additional questions or inquiries regarding your transition in medical care, please call [phone]. We are dedicated to making this transition as seamless as possible and appreciate the opportunity to care for you.

## 2022-06-15 ENCOUNTER — HOSPITAL ENCOUNTER (OUTPATIENT)
Dept: LAB | Facility: MEDICAL CENTER | Age: 62
End: 2022-06-15
Attending: FAMILY MEDICINE
Payer: COMMERCIAL

## 2022-06-15 DIAGNOSIS — Z00.00 HEALTHCARE MAINTENANCE: ICD-10-CM

## 2022-06-15 LAB
ALBUMIN SERPL BCP-MCNC: 4.6 G/DL (ref 3.2–4.9)
ALBUMIN/GLOB SERPL: 2.3 G/DL
ALP SERPL-CCNC: 65 U/L (ref 30–99)
ALT SERPL-CCNC: 22 U/L (ref 2–50)
ANION GAP SERPL CALC-SCNC: 13 MMOL/L (ref 7–16)
AST SERPL-CCNC: 20 U/L (ref 12–45)
BILIRUB SERPL-MCNC: 1.1 MG/DL (ref 0.1–1.5)
BUN SERPL-MCNC: 11 MG/DL (ref 8–22)
CALCIUM SERPL-MCNC: 9 MG/DL (ref 8.5–10.5)
CHLORIDE SERPL-SCNC: 108 MMOL/L (ref 96–112)
CHOLEST SERPL-MCNC: 154 MG/DL (ref 100–199)
CO2 SERPL-SCNC: 25 MMOL/L (ref 20–33)
CREAT SERPL-MCNC: 0.69 MG/DL (ref 0.5–1.4)
FASTING STATUS PATIENT QL REPORTED: NORMAL
GFR SERPLBLD CREATININE-BSD FMLA CKD-EPI: 106 ML/MIN/1.73 M 2
GLOBULIN SER CALC-MCNC: 2 G/DL (ref 1.9–3.5)
GLUCOSE SERPL-MCNC: 91 MG/DL (ref 65–99)
HDLC SERPL-MCNC: 48 MG/DL
LDLC SERPL CALC-MCNC: 91 MG/DL
POTASSIUM SERPL-SCNC: 4.2 MMOL/L (ref 3.6–5.5)
PROT SERPL-MCNC: 6.6 G/DL (ref 6–8.2)
PSA SERPL-MCNC: 1.02 NG/ML (ref 0–4)
SODIUM SERPL-SCNC: 146 MMOL/L (ref 135–145)
TRIGL SERPL-MCNC: 76 MG/DL (ref 0–149)

## 2022-06-15 PROCEDURE — 36415 COLL VENOUS BLD VENIPUNCTURE: CPT

## 2022-06-15 PROCEDURE — 84153 ASSAY OF PSA TOTAL: CPT

## 2022-06-15 PROCEDURE — 80053 COMPREHEN METABOLIC PANEL: CPT

## 2022-06-15 PROCEDURE — 80061 LIPID PANEL: CPT

## 2022-06-22 ENCOUNTER — TELEPHONE (OUTPATIENT)
Dept: SCHEDULING | Facility: IMAGING CENTER | Age: 62
End: 2022-06-22

## 2022-06-22 ENCOUNTER — OFFICE VISIT (OUTPATIENT)
Dept: MEDICAL GROUP | Facility: MEDICAL CENTER | Age: 62
End: 2022-06-22
Payer: COMMERCIAL

## 2022-06-22 VITALS
DIASTOLIC BLOOD PRESSURE: 64 MMHG | RESPIRATION RATE: 20 BRPM | TEMPERATURE: 98.1 F | SYSTOLIC BLOOD PRESSURE: 128 MMHG | HEART RATE: 53 BPM | HEIGHT: 70 IN | WEIGHT: 203.37 LBS | BODY MASS INDEX: 29.12 KG/M2 | OXYGEN SATURATION: 95 %

## 2022-06-22 DIAGNOSIS — I10 ESSENTIAL HYPERTENSION: ICD-10-CM

## 2022-06-22 DIAGNOSIS — L98.9 SKIN LESION: ICD-10-CM

## 2022-06-22 PROCEDURE — 99214 OFFICE O/P EST MOD 30 MIN: CPT | Performed by: FAMILY MEDICINE

## 2022-06-22 ASSESSMENT — FIBROSIS 4 INDEX: FIB4 SCORE: 1.31

## 2022-06-22 NOTE — ASSESSMENT & PLAN NOTE
Patient recently got his haircut.  He feels the nodule on the occiput that he wanted me to evaluate.  It does not bother him and is not painful.

## 2022-06-22 NOTE — PROGRESS NOTES
"[facility]  Progress Note  Established Patient    Subjective:   David Houston is a 61 y.o. male here today with a chief complaint of BP. The patient is alone.     Skin lesion  Patient recently got his haircut.  He feels the nodule on the occiput that he wanted me to evaluate.  It does not bother him and is not painful.    Essential hypertension  Blood pressure at goal off ACE inhibitor.  Patient denies new dizziness or lightheadedness.      Current Outpatient Medications on File Prior to Visit   Medication Sig Dispense Refill   • MAGNESIUM MALATE PO Take 400 mg by mouth.     • LYCOPENE PO Take 2 Capsules by mouth every day.     • atorvastatin (LIPITOR) 40 MG Tab TAKE 1 TABLET DAILY 100 Tablet 4   • fluticasone (FLONASE) 50 MCG/ACT nasal spray USE 1 TO 2 SPRAYS IN EACH NOSTRIL EVERY DAY 48 g 3   • VITAMIN D, CHOLECALCIFEROL, PO Take 2,000 Int'l Units by mouth every day.     • TURMERIC PO Take 2,600 mg by mouth every day.     • Coenzyme Q10 (CO Q 10) 100 MG Cap Take 100 mg by mouth every day. Uviquonol     • Misc Natural Products (TART WILKERSON ADVANCED) Cap Take 1-2 Caps by mouth every day.     • Glucos-Chond-Hyal Ac-Ca Fructo (MOVE FREE [facility] ADVANCE) Tab Take 1 Tab by mouth every day.     • aspirin 81 MG tablet Take  by mouth. daily       No current facility-administered medications on file prior to visit.          Objective:     Vitals:    06/22/22 1317 06/22/22 1320   BP: 132/78 128/64   BP Location: Left arm Right arm   Patient Position: Sitting Sitting   BP Cuff Size: Adult long Adult long   Pulse: (!) 53    Resp: 20    Temp: 36.7 °C (98.1 °F)    TempSrc: Temporal    SpO2: 95%    Weight: 92.3 kg (203 lb 6 oz)    Height: 1.778 m (5' 10\")        Physical Exam:  General: alert in no apparent distress.   Skin : On the occiput is a flesh-colored nodule approximately 0.25 cm in size.        Assessment and Plan:     1. Skin lesion  Recommended warm compresses 2-3 times daily for 2 weeks.  If the " skin lesion does not resolve in 2 weeks, I asked the patient to reach out to me and I will arrange a dermatology consultation.    2. Essential hypertension  -Continue to monitor, blood pressure at goal.  Advised home monitoring about twice weekly. Call if SBP > 140.     I informed the patient that I will not be returning to work with [employer] as of August 01, 2022. I informed the patient that he should establish with a new doctor before then. It has been a privilege serving as this patient's family doctor and I wish him the best.  I informed the patient that if he needs to see me before I leave I'm available for appointments.          Followup: Return if symptoms worsen or fail to improve.

## 2022-07-29 ENCOUNTER — OFFICE VISIT (OUTPATIENT)
Dept: MEDICAL GROUP | Facility: PHYSICIAN GROUP | Age: 62
End: 2022-07-29
Payer: COMMERCIAL

## 2022-07-29 VITALS
HEIGHT: 70 IN | SYSTOLIC BLOOD PRESSURE: 140 MMHG | TEMPERATURE: 98.3 F | HEART RATE: 68 BPM | OXYGEN SATURATION: 95 % | DIASTOLIC BLOOD PRESSURE: 68 MMHG | BODY MASS INDEX: 27.92 KG/M2 | WEIGHT: 195 LBS

## 2022-07-29 DIAGNOSIS — I10 ESSENTIAL HYPERTENSION: ICD-10-CM

## 2022-07-29 DIAGNOSIS — E78.5 DYSLIPIDEMIA: ICD-10-CM

## 2022-07-29 DIAGNOSIS — Z76.89 ENCOUNTER TO ESTABLISH CARE: ICD-10-CM

## 2022-07-29 PROCEDURE — 99214 OFFICE O/P EST MOD 30 MIN: CPT

## 2022-07-29 ASSESSMENT — FIBROSIS 4 INDEX: FIB4 SCORE: 1.31

## 2022-07-29 NOTE — ASSESSMENT & PLAN NOTE
Chronic condition currently stable  - Continue to take atorvastatin 40 mg daily  - Recommend healthy diet and activity daily at least 30 minutes most days of the week  -Repeat lipid panel in June 2023

## 2022-07-29 NOTE — PROGRESS NOTES
"Subjective:     CC:  Diagnoses of Encounter to establish care, Essential hypertension, and Dyslipidemia were pertinent to this visit.    HISTORY OF THE PRESENT ILLNESS: Patient is a 61 y.o. male. This pleasant patient is here today to establish care and discuss the following problems:    Problem   Essential Hypertension    History of hypertension for which she was taking lisinopril 10 mg daily but reports his blood pressure was within normal limits so his previous PCP took him off.  Blood pressure today in clinic slightly elevated at 140/68.  I have advised patient to do home blood pressure readings.  He will follow-up if readings consistently remain above 140 systolic or 90 diastolic.       Dyslipidemia    History of dyslipidemia for which patient takes atorvastatin daily.  He is doing well on this medication without reported side effects.  Last lipid panel was within normal limits.     Encounter to Establish Care    Warren is a relatively healthy 61 year old male. He is here to establish care.  He has a history of hyperlipidemia and hypertension.  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  His father does go to  on a regular basis which gives some reprieve.  Patient has no complaints today other than he is recovering from a lingering upper respiratory tract infection that has mostly resolved.         Health Maintenance: Completed recommend COVID and zoster vaccines at outside pharmacy of choice    ROS:   Review of Systems   All other systems reviewed and are negative.        Objective:     Exam: BP (!) 140/68 (BP Location: Left arm, Patient Position: Sitting, BP Cuff Size: Adult)   Pulse 68   Temp 36.8 °C (98.3 °F) (Temporal)   Ht 1.778 m (5' 10\")   Wt 88.5 kg (195 lb)   SpO2 95%  Body mass index is 27.98 kg/m².    Physical Exam  Constitutional:       General: He is not in acute distress.     Appearance: Normal appearance. He is normal weight. He is not ill-appearing or " toxic-appearing.   HENT:      Head: Normocephalic and atraumatic.      Right Ear: Tympanic membrane normal.      Left Ear: Tympanic membrane normal.      Nose: Nose normal.      Mouth/Throat:      Mouth: Mucous membranes are moist.      Pharynx: Oropharynx is clear. No posterior oropharyngeal erythema.   Eyes:      Extraocular Movements: Extraocular movements intact.      Conjunctiva/sclera: Conjunctivae normal.      Pupils: Pupils are equal, round, and reactive to light.   Cardiovascular:      Rate and Rhythm: Normal rate and regular rhythm.      Pulses: Normal pulses.      Heart sounds: Normal heart sounds. No murmur heard.    No friction rub. No gallop.   Pulmonary:      Effort: Pulmonary effort is normal. No respiratory distress.      Breath sounds: Normal breath sounds.   Musculoskeletal:         General: Normal range of motion.      Cervical back: Normal range of motion and neck supple.   Lymphadenopathy:      Cervical: No cervical adenopathy.   Skin:     General: Skin is warm and dry.      Capillary Refill: Capillary refill takes less than 2 seconds.   Neurological:      General: No focal deficit present.      Mental Status: He is alert and oriented to person, place, and time.   Psychiatric:         Mood and Affect: Mood normal.         Behavior: Behavior normal.           Labs: Labs done 6/15/2022 show sodium slightly elevated 146 otherwise CMP within normal limits, lipid panel within normal limits, PSA within normal limits    Assessment & Plan: Medical Decision Making   61 y.o. male with the following -    Problem List Items Addressed This Visit     Essential hypertension     Chronic condition which may be reoccurring  -Recommend home blood pressure monitoring and follow-up as discussed for parameters greater than 140/90           Dyslipidemia     Chronic condition currently stable  - Continue to take atorvastatin 40 mg daily  - Recommend healthy diet and activity daily at least 30 minutes most days of the  week  -Repeat lipid panel in June 2023           Encounter to establish care     Health maintenance topics discussed  - Recommend self-care including healthy diet and exercise                   Differential diagnosis, natural history, supportive care, and indications for immediate follow-up discussed.  Shared decision making approach was utilized, and patient is amendable with plan of care.  Patient understands to return to clinic or go to the emergency department if symptoms worsen. All questions and concerns addressed.      Return in about 1 year (around 7/29/2023) for Health Maintanence.    Please note that this dictation was created using voice recognition software. I have made every reasonable attempt to correct obvious errors, but I expect that there are errors of grammar and possibly content that I did not discover before finalizing the note.

## 2022-07-29 NOTE — ASSESSMENT & PLAN NOTE
Chronic condition which may be reoccurring  -Recommend home blood pressure monitoring and follow-up as discussed for parameters greater than 140/90

## 2022-12-16 ENCOUNTER — OFFICE VISIT (OUTPATIENT)
Dept: MEDICAL GROUP | Facility: OTHER | Age: 62
End: 2022-12-16
Payer: COMMERCIAL

## 2022-12-16 VITALS
DIASTOLIC BLOOD PRESSURE: 82 MMHG | HEART RATE: 57 BPM | BODY MASS INDEX: 28.14 KG/M2 | SYSTOLIC BLOOD PRESSURE: 150 MMHG | HEIGHT: 71 IN | WEIGHT: 201 LBS | TEMPERATURE: 98.1 F | OXYGEN SATURATION: 96 %

## 2022-12-16 DIAGNOSIS — S60.229A CONTUSION OF HAND, UNSPECIFIED LATERALITY, INITIAL ENCOUNTER: ICD-10-CM

## 2022-12-16 DIAGNOSIS — J30.1 SEASONAL ALLERGIC RHINITIS DUE TO POLLEN: ICD-10-CM

## 2022-12-16 DIAGNOSIS — M67.431 GANGLION CYST OF WRIST, RIGHT: ICD-10-CM

## 2022-12-16 PROCEDURE — 99213 OFFICE O/P EST LOW 20 MIN: CPT | Performed by: FAMILY MEDICINE

## 2022-12-16 RX ORDER — FLUTICASONE PROPIONATE 50 MCG
SPRAY, SUSPENSION (ML) NASAL
Qty: 48 G | Refills: 3 | Status: SHIPPED | OUTPATIENT
Start: 2022-12-16 | End: 2022-12-20 | Stop reason: SDUPTHER

## 2022-12-16 ASSESSMENT — ENCOUNTER SYMPTOMS
CARDIOVASCULAR NEGATIVE: 1
GASTROINTESTINAL NEGATIVE: 1
CONSTITUTIONAL NEGATIVE: 1
PSYCHIATRIC NEGATIVE: 1
EYES NEGATIVE: 1
NEUROLOGICAL NEGATIVE: 1
RESPIRATORY NEGATIVE: 1

## 2022-12-16 ASSESSMENT — FIBROSIS 4 INDEX: FIB4 SCORE: 1.33

## 2022-12-16 NOTE — PROGRESS NOTES
Subjective:   CC:   Chief Complaint   Patient presents with    Establish Care     Lump on right hand/wrist for a couple months now       HPI: David Quezada is a 62 y.o. male who presents today for the following problems:    Problem   Contusion of Hand    Mr. Houston comes in to see me today for a couple of reasons  1 is going to be to get his hands checked he tripped and fell couple days ago and has some contusions to both of his hands he has good range of motion with all fingers and its black and blue but for the most part no worse for wear.  He does have a couple of scrapes on the left thenar eminence and the right hypothenar eminence but other than that he is no worse for wear     Ganglion Cyst of Wrist, Right    Patient also has a ganglion on cyst to his right wrist.  States that it does not really bother him much and he has a scheduled appointment to see Dr. Adams for his median nerve entrapment in a couple of weeks he is going to ask Dr. Adams to look at it.  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         Current Outpatient Medications   Medication Sig Dispense Refill    fluticasone (FLONASE) 50 MCG/ACT nasal spray USE 1 TO 2 SPRAYS IN EACH NOSTRIL EVERY DAY 48 g 3    MAGNESIUM MALATE PO Take 400 mg by mouth.      LYCOPENE PO Take 2 Capsules by mouth every day.      atorvastatin (LIPITOR) 40 MG Tab TAKE 1 TABLET DAILY 100 Tablet 4    VITAMIN D, CHOLECALCIFEROL, PO Take 2,000 Int'l Units by mouth every day.      TURMERIC PO Take 2,600 mg by mouth every day.      Coenzyme Q10 (CO Q 10) 100 MG Cap Take 100 mg by mouth every day. Uviquonol      Misc Natural Products (TART WILKERSON ADVANCED) Cap Take 1-2 Caps by mouth every day.      Glucos-Chond-Hyal Ac-Ca Fructo (MOVE FREE JOINT HEALTH ADVANCE) Tab Take 1 Tab by mouth every day.      aspirin 81 MG tablet Take  by mouth. daily       No current facility-administered medications for this visit.       Social History     Tobacco Use    Smoking status:  "Former     Packs/day: 0.50     Years: 15.00     Pack years: 7.50     Types: Cigarettes     Quit date: 3/15/1994     Years since quittin.7    Smokeless tobacco: Never   Vaping Use    Vaping Use: Never used   Substance Use Topics    Alcohol use: Yes     Comment: rarely/one per month    Drug use: No     Comment: 3 drinks a mo if that       Review of Systems   Constitutional: Negative.    HENT: Negative.     Eyes: Negative.    Respiratory: Negative.     Cardiovascular: Negative.    Gastrointestinal: Negative.    Skin: Negative.    Neurological: Negative.    Psychiatric/Behavioral: Negative.         Objective:     Vitals:    22 1124   BP: (!) 150/82   BP Location: Left arm   Patient Position: Sitting   Pulse: (!) 57   Temp: 36.7 °C (98.1 °F)   SpO2: 96%   Weight: 91.2 kg (201 lb)   Height: 1.803 m (5' 11\")     Body mass index is 28.03 kg/m².     Physical Exam  Vitals reviewed.   HENT:      Head: Normocephalic.   Cardiovascular:      Rate and Rhythm: Normal rate and regular rhythm.      Pulses: Normal pulses.      Heart sounds: Normal heart sounds. No murmur heard.    No gallop.   Pulmonary:      Effort: Pulmonary effort is normal. No respiratory distress.      Breath sounds: Normal breath sounds. No wheezing or rales.   Musculoskeletal:         General: Normal range of motion.   Neurological:      General: No focal deficit present.        Assessment & Plan:   Contusion of hand  Recommend that he continue to work with his hands and move is much as possible I do not really see any reason for follow-up at this point for this issue.  He can follow-up however if he gets worse there is increased inflammation and swelling or pain    Ganglion cyst of wrist, right  Ganglion cyst to right wrist will refer to hand specialist for treatment.      Followup: Return in about 1 month (around 2023), or if symptoms worsen or fail to improve.    Deyvi Abbott M.D.    Please note that this dictation was created using voice " recognition software. I have made every reasonable attempt to correct obvious errors, but I expect that there are errors of grammar and possibly content that I did not discover before finalizing the note.

## 2022-12-16 NOTE — ASSESSMENT & PLAN NOTE
Recommend that he continue to work with his hands and move is much as possible I do not really see any reason for follow-up at this point for this issue.  He can follow-up however if he gets worse there is increased inflammation and swelling or pain

## 2022-12-20 DIAGNOSIS — J30.1 SEASONAL ALLERGIC RHINITIS DUE TO POLLEN: ICD-10-CM

## 2022-12-21 RX ORDER — FLUTICASONE PROPIONATE 50 MCG
SPRAY, SUSPENSION (ML) NASAL
Qty: 48 G | Refills: 3 | Status: SHIPPED | OUTPATIENT
Start: 2022-12-21 | End: 2023-11-06 | Stop reason: SDUPTHER

## 2023-01-13 RX ORDER — ATORVASTATIN CALCIUM 40 MG/1
TABLET, FILM COATED ORAL
Qty: 100 TABLET | Refills: 4 | Status: SHIPPED | OUTPATIENT
Start: 2023-01-13 | End: 2024-03-08 | Stop reason: SDUPTHER

## 2023-04-19 ENCOUNTER — TELEPHONE (OUTPATIENT)
Dept: MEDICAL GROUP | Facility: CLINIC | Age: 63
End: 2023-04-19
Payer: OTHER GOVERNMENT

## 2023-04-19 DIAGNOSIS — R35.0 URINARY FREQUENCY: ICD-10-CM

## 2023-04-19 DIAGNOSIS — E78.5 DYSLIPIDEMIA: ICD-10-CM

## 2023-04-19 DIAGNOSIS — I10 ESSENTIAL HYPERTENSION: ICD-10-CM

## 2023-06-07 ENCOUNTER — HOSPITAL ENCOUNTER (OUTPATIENT)
Dept: LAB | Facility: MEDICAL CENTER | Age: 63
End: 2023-06-07
Attending: FAMILY MEDICINE
Payer: COMMERCIAL

## 2023-06-07 DIAGNOSIS — E78.5 DYSLIPIDEMIA: ICD-10-CM

## 2023-06-07 DIAGNOSIS — I10 ESSENTIAL HYPERTENSION: ICD-10-CM

## 2023-06-07 DIAGNOSIS — R35.0 URINARY FREQUENCY: ICD-10-CM

## 2023-06-07 LAB
ALBUMIN SERPL BCP-MCNC: 4.5 G/DL (ref 3.2–4.9)
ALBUMIN/GLOB SERPL: 1.8 G/DL
ALP SERPL-CCNC: 89 U/L (ref 30–99)
ALT SERPL-CCNC: 24 U/L (ref 2–50)
ANION GAP SERPL CALC-SCNC: 12 MMOL/L (ref 7–16)
AST SERPL-CCNC: 23 U/L (ref 12–45)
BILIRUB SERPL-MCNC: 1.2 MG/DL (ref 0.1–1.5)
BUN SERPL-MCNC: 9 MG/DL (ref 8–22)
CALCIUM ALBUM COR SERPL-MCNC: 8.8 MG/DL (ref 8.5–10.5)
CALCIUM SERPL-MCNC: 9.2 MG/DL (ref 8.5–10.5)
CHLORIDE SERPL-SCNC: 107 MMOL/L (ref 96–112)
CHOLEST SERPL-MCNC: 153 MG/DL (ref 100–199)
CO2 SERPL-SCNC: 26 MMOL/L (ref 20–33)
CREAT SERPL-MCNC: 0.66 MG/DL (ref 0.5–1.4)
ERYTHROCYTE [DISTWIDTH] IN BLOOD BY AUTOMATED COUNT: 44.8 FL (ref 35.9–50)
EST. AVERAGE GLUCOSE BLD GHB EST-MCNC: 108 MG/DL
FASTING STATUS PATIENT QL REPORTED: NORMAL
GFR SERPLBLD CREATININE-BSD FMLA CKD-EPI: 107 ML/MIN/1.73 M 2
GLOBULIN SER CALC-MCNC: 2.5 G/DL (ref 1.9–3.5)
GLUCOSE SERPL-MCNC: 73 MG/DL (ref 65–99)
HBA1C MFR BLD: 5.4 % (ref 4–5.6)
HCT VFR BLD AUTO: 46.1 % (ref 42–52)
HDLC SERPL-MCNC: 52 MG/DL
HGB BLD-MCNC: 15.5 G/DL (ref 14–18)
LDLC SERPL CALC-MCNC: 90 MG/DL
MCH RBC QN AUTO: 30.6 PG (ref 27–33)
MCHC RBC AUTO-ENTMCNC: 33.6 G/DL (ref 32.3–36.5)
MCV RBC AUTO: 91.1 FL (ref 81.4–97.8)
PLATELET # BLD AUTO: 171 K/UL (ref 164–446)
PMV BLD AUTO: 10.8 FL (ref 9–12.9)
POTASSIUM SERPL-SCNC: 4.1 MMOL/L (ref 3.6–5.5)
PROT SERPL-MCNC: 7 G/DL (ref 6–8.2)
RBC # BLD AUTO: 5.06 M/UL (ref 4.7–6.1)
SODIUM SERPL-SCNC: 145 MMOL/L (ref 135–145)
TRIGL SERPL-MCNC: 53 MG/DL (ref 0–149)
WBC # BLD AUTO: 5.8 K/UL (ref 4.8–10.8)

## 2023-06-07 PROCEDURE — 83036 HEMOGLOBIN GLYCOSYLATED A1C: CPT

## 2023-06-07 PROCEDURE — 84153 ASSAY OF PSA TOTAL: CPT

## 2023-06-07 PROCEDURE — 84154 ASSAY OF PSA FREE: CPT

## 2023-06-07 PROCEDURE — 80053 COMPREHEN METABOLIC PANEL: CPT

## 2023-06-07 PROCEDURE — 36415 COLL VENOUS BLD VENIPUNCTURE: CPT

## 2023-06-07 PROCEDURE — 80061 LIPID PANEL: CPT

## 2023-06-07 PROCEDURE — 85027 COMPLETE CBC AUTOMATED: CPT

## 2023-06-09 LAB
PSA FREE MFR SERPL: 33 %
PSA FREE SERPL-MCNC: 0.3 NG/ML
PSA SERPL-MCNC: 0.9 NG/ML (ref 0–4)

## 2023-06-20 SDOH — ECONOMIC STABILITY: FOOD INSECURITY: WITHIN THE PAST 12 MONTHS, YOU WORRIED THAT YOUR FOOD WOULD RUN OUT BEFORE YOU GOT MONEY TO BUY MORE.: NEVER TRUE

## 2023-06-20 SDOH — HEALTH STABILITY: PHYSICAL HEALTH: ON AVERAGE, HOW MANY DAYS PER WEEK DO YOU ENGAGE IN MODERATE TO STRENUOUS EXERCISE (LIKE A BRISK WALK)?: 0 DAYS

## 2023-06-20 SDOH — ECONOMIC STABILITY: FOOD INSECURITY: WITHIN THE PAST 12 MONTHS, THE FOOD YOU BOUGHT JUST DIDN'T LAST AND YOU DIDN'T HAVE MONEY TO GET MORE.: NEVER TRUE

## 2023-06-20 SDOH — HEALTH STABILITY: MENTAL HEALTH
STRESS IS WHEN SOMEONE FEELS TENSE, NERVOUS, ANXIOUS, OR CAN'T SLEEP AT NIGHT BECAUSE THEIR MIND IS TROUBLED. HOW STRESSED ARE YOU?: ONLY A LITTLE

## 2023-06-20 SDOH — ECONOMIC STABILITY: INCOME INSECURITY: IN THE LAST 12 MONTHS, WAS THERE A TIME WHEN YOU WERE NOT ABLE TO PAY THE MORTGAGE OR RENT ON TIME?: NO

## 2023-06-20 SDOH — ECONOMIC STABILITY: HOUSING INSECURITY

## 2023-06-20 SDOH — HEALTH STABILITY: PHYSICAL HEALTH: ON AVERAGE, HOW MANY MINUTES DO YOU ENGAGE IN EXERCISE AT THIS LEVEL?: 10 MIN

## 2023-06-20 SDOH — ECONOMIC STABILITY: INCOME INSECURITY: HOW HARD IS IT FOR YOU TO PAY FOR THE VERY BASICS LIKE FOOD, HOUSING, MEDICAL CARE, AND HEATING?: NOT HARD AT ALL

## 2023-06-20 ASSESSMENT — SOCIAL DETERMINANTS OF HEALTH (SDOH)
HOW OFTEN DO YOU ATTENT MEETINGS OF THE CLUB OR ORGANIZATION YOU BELONG TO?: 1 TO 4 TIMES PER YEAR
HOW OFTEN DO YOU GET TOGETHER WITH FRIENDS OR RELATIVES?: TWICE A WEEK
HOW OFTEN DO YOU GET TOGETHER WITH FRIENDS OR RELATIVES?: TWICE A WEEK
ARE YOU MARRIED, WIDOWED, DIVORCED, SEPARATED, NEVER MARRIED, OR LIVING WITH A PARTNER?: NEVER MARRIED
HOW OFTEN DO YOU ATTEND CHURCH OR RELIGIOUS SERVICES?: 1 TO 4 TIMES PER YEAR
HOW OFTEN DO YOU ATTEND CHURCH OR RELIGIOUS SERVICES?: 1 TO 4 TIMES PER YEAR
HOW OFTEN DO YOU HAVE SIX OR MORE DRINKS ON ONE OCCASION: NEVER
IN A TYPICAL WEEK, HOW MANY TIMES DO YOU TALK ON THE PHONE WITH FAMILY, FRIENDS, OR NEIGHBORS?: ONCE A WEEK
WITHIN THE PAST 12 MONTHS, YOU WORRIED THAT YOUR FOOD WOULD RUN OUT BEFORE YOU GOT THE MONEY TO BUY MORE: NEVER TRUE
IN A TYPICAL WEEK, HOW MANY TIMES DO YOU TALK ON THE PHONE WITH FAMILY, FRIENDS, OR NEIGHBORS?: ONCE A WEEK
ARE YOU MARRIED, WIDOWED, DIVORCED, SEPARATED, NEVER MARRIED, OR LIVING WITH A PARTNER?: NEVER MARRIED
DO YOU BELONG TO ANY CLUBS OR ORGANIZATIONS SUCH AS CHURCH GROUPS UNIONS, FRATERNAL OR ATHLETIC GROUPS, OR SCHOOL GROUPS?: YES
HOW OFTEN DO YOU ATTENT MEETINGS OF THE CLUB OR ORGANIZATION YOU BELONG TO?: 1 TO 4 TIMES PER YEAR
HOW HARD IS IT FOR YOU TO PAY FOR THE VERY BASICS LIKE FOOD, HOUSING, MEDICAL CARE, AND HEATING?: NOT HARD AT ALL
HOW MANY DRINKS CONTAINING ALCOHOL DO YOU HAVE ON A TYPICAL DAY WHEN YOU ARE DRINKING: 1 OR 2
HOW OFTEN DO YOU HAVE A DRINK CONTAINING ALCOHOL: 2-3 TIMES A WEEK
DO YOU BELONG TO ANY CLUBS OR ORGANIZATIONS SUCH AS CHURCH GROUPS UNIONS, FRATERNAL OR ATHLETIC GROUPS, OR SCHOOL GROUPS?: YES

## 2023-06-20 ASSESSMENT — LIFESTYLE VARIABLES
HOW MANY STANDARD DRINKS CONTAINING ALCOHOL DO YOU HAVE ON A TYPICAL DAY: 1 OR 2
AUDIT-C TOTAL SCORE: 3
HOW OFTEN DO YOU HAVE SIX OR MORE DRINKS ON ONE OCCASION: NEVER
HOW OFTEN DO YOU HAVE A DRINK CONTAINING ALCOHOL: 2-3 TIMES A WEEK
SKIP TO QUESTIONS 9-10: 1

## 2023-06-23 ENCOUNTER — OFFICE VISIT (OUTPATIENT)
Dept: MEDICAL GROUP | Facility: OTHER | Age: 63
End: 2023-06-23
Payer: COMMERCIAL

## 2023-06-23 VITALS
SYSTOLIC BLOOD PRESSURE: 180 MMHG | WEIGHT: 197 LBS | HEART RATE: 55 BPM | HEIGHT: 71 IN | DIASTOLIC BLOOD PRESSURE: 82 MMHG | OXYGEN SATURATION: 97 % | TEMPERATURE: 97.7 F | BODY MASS INDEX: 27.58 KG/M2

## 2023-06-23 DIAGNOSIS — M67.431 GANGLION CYST OF WRIST, RIGHT: ICD-10-CM

## 2023-06-23 DIAGNOSIS — F43.21 GRIEF: ICD-10-CM

## 2023-06-23 DIAGNOSIS — Z00.00 HEALTHCARE MAINTENANCE: ICD-10-CM

## 2023-06-23 DIAGNOSIS — I10 ESSENTIAL HYPERTENSION: ICD-10-CM

## 2023-06-23 DIAGNOSIS — S69.92XA INJURY OF LEFT THUMBNAIL, INITIAL ENCOUNTER: ICD-10-CM

## 2023-06-23 PROCEDURE — 3077F SYST BP >= 140 MM HG: CPT | Performed by: FAMILY MEDICINE

## 2023-06-23 PROCEDURE — 99396 PREV VISIT EST AGE 40-64: CPT | Performed by: FAMILY MEDICINE

## 2023-06-23 PROCEDURE — 3079F DIAST BP 80-89 MM HG: CPT | Performed by: FAMILY MEDICINE

## 2023-06-23 RX ORDER — RAMIPRIL 2.5 MG/1
2.5 CAPSULE ORAL DAILY
Qty: 90 CAPSULE | Refills: 3 | Status: SHIPPED | OUTPATIENT
Start: 2023-06-23

## 2023-06-23 RX ORDER — RAMIPRIL 2.5 MG/1
2.5 CAPSULE ORAL DAILY
Qty: 30 CAPSULE | Refills: 11 | Status: SHIPPED | OUTPATIENT
Start: 2023-06-23 | End: 2023-06-23

## 2023-06-23 ASSESSMENT — FIBROSIS 4 INDEX: FIB4 SCORE: 1.65

## 2023-06-23 ASSESSMENT — PATIENT HEALTH QUESTIONNAIRE - PHQ9: CLINICAL INTERPRETATION OF PHQ2 SCORE: 0

## 2023-06-23 NOTE — ASSESSMENT & PLAN NOTE
Lipid profile WNL  CMP WNL  PSA unremarkable  HTN - BP as per essential HTN section (recheck in clinic showed was 178/88

## 2023-06-23 NOTE — ASSESSMENT & PLAN NOTE
Acute on chronic, worsening  Has historically been on 10 mg Lisinopril in the past.  Was wondering about trying Ramipril  Ramipril 2.5 mg qday for 90 days with 3 refills sent - will adjust as needed

## 2023-06-23 NOTE — ASSESSMENT & PLAN NOTE
Chronic, stable  Does overlie the radial artery  Denies any other symptoms  Will defer to Dr. Adams

## 2023-06-23 NOTE — ASSESSMENT & PLAN NOTE
New problem to provider  Self-limiting issue, improving   Regular Diet - No restrictions/Other Diet Instructions

## 2023-06-23 NOTE — PROGRESS NOTES
Subjective:   David Houston is a 62 y.o. male here for the evaluation and management of Annual Exam (Lab results review)      Problem   Injury of Left Thumbnail    Concern for left thumbnail pain.  Bumped it on something and noticed some pain in the nailbed.  No discoloration, but minimal bleeding.  Has been using neosporin and a Band-aid.     Healthcare Maintenance    Presents to discuss labs.  Pleased to see his labs look great.      Does have elevated blood pressure in clinic.  Checks BP at home and it is never this high.       Grief    Patient's father passed away in the last 4-6 weeks.  Feels like he's doing well overall, but now has a lot more time on his hands.     Ganglion Cyst of Wrist, Right    Brings up the ganglion cyst on his right wrist.  As per previous visits, patient also has a ganglion on cyst to his right wrist.  States that it does not really bother him much and he has a scheduled appointment to see Dr. Adams for his median nerve entrapment in a couple of weeks he is going to ask Dr. Adams to look at it.  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     Essential Hypertension    History of hypertension for which she was taking lisinopril 10 mg daily but reports his blood pressure was within normal limits so his previous PCP took him off.  Blood pressure today in clinic slightly elevated at 140/68.  I have advised patient to do home blood pressure readings.  He will follow-up if readings consistently remain above 140 systolic or 90 diastolic.         ROS    Current Outpatient Medications   Medication Sig Dispense Refill    ramipril (ALTACE) 2.5 MG Cap Take 1 Capsule by mouth every day. 90 Capsule 3    atorvastatin (LIPITOR) 40 MG Tab TAKE 1 TABLET DAILY 100 Tablet 4    fluticasone (FLONASE) 50 MCG/ACT nasal spray USE 1 TO 2 SPRAYS IN EACH NOSTRIL EVERY DAY 48 g 3    MAGNESIUM MALATE PO Take 400 mg by mouth.      LYCOPENE PO Take 2 Capsules by mouth every day.      VITAMIN D,  CHOLECALCIFEROL, PO Take 2,000 Int'l Units by mouth every day.      TURMERIC PO Take 2,600 mg by mouth every day.      Coenzyme Q10 (CO Q 10) 100 MG Cap Take 100 mg by mouth every day. Uviquonol      aspirin 81 MG tablet Take  by mouth. daily      Misc Natural Products (TART WILKERSON ADVANCED) Cap Take 1-2 Caps by mouth every day.      Glucos-Chond-Hyal Ac-Ca Fructo (MOVE FREE JOINT HEALTH ADVANCE) Tab Take 1 Tab by mouth every day.       No current facility-administered medications for this visit.       Allergies  Patient has no known allergies.    Past Medical History:   Diagnosis Date    Fall     Hypercholesterolemia     Hypertension        Patient Active Problem List    Diagnosis Date Noted    Injury of left thumbnail 2023    Healthcare maintenance 2023    Grief 2023    Contusion of hand 2022    Ganglion cyst of wrist, right 2022    Left ear pain 2019    Left wrist pain 10/18/2018    Skin lesion 2018    Ataxia 03/15/2018    Cavernous angioma 03/15/2018    Essential hypertension 2018    Dyslipidemia 2018    Allergic rhinitis 2018    Encounter to establish care 2018    Abnormal MRI 2018    Tinnitus of left ear 2018       Past Surgical History  Past Surgical History:   Procedure Laterality Date    HARDWARE REMOVAL ORTHO  2011    Performed by ROSALINE BENITEZ at SURGERY [facility] ORS    ORIF, ANKLE  2010    Performed by ROSALINE BENITEZ at SURGERY [facility] ORS    OTHER ABDOMINAL SURGERY  1970     double hernia repair       Social History     Socioeconomic History    Marital status: Single    Highest education level: Associate degree: occupational, technical, or vocational program   Tobacco Use    Smoking status: Former     Packs/day: 0.50     Years: 15.00     Pack years: 7.50     Types: Cigarettes     Quit date: 3/15/1994     Years since quittin.2    Smokeless tobacco: Never   Vaping Use    Vaping Use: Never used  "  Substance and Sexual Activity    Alcohol use: Yes     Comment: rarely/one per month    Drug use: No     Comment: 3 drinks a mo if that     Social Determinants of Health     Financial Resource Strain: Low Risk  (6/20/2023)    Overall Financial Resource Strain (CARDIA)     Difficulty of Paying Living Expenses: Not hard at all   Food Insecurity: No Food Insecurity (6/20/2023)    Hunger Vital Sign     Worried About Running Out of Food in the Last Year: Never true     Ran Out of Food in the Last Year: Never true   Transportation Needs: No Transportation Needs (6/20/2023)    PRAPARE - Transportation     Lack of Transportation (Medical): No     Lack of Transportation (Non-Medical): No   Physical Activity: Inactive (6/20/2023)    Exercise Vital Sign     Days of Exercise per Week: 0 days     Minutes of Exercise per Session: 10 min   Stress: No Stress Concern Present (6/20/2023)    [employer] - Occupational Stress Questionnaire     Feeling of Stress : Only a little   Social Connections: Moderately Integrated (6/20/2023)    Social Connection and Isolation Panel [NHANES]     Frequency of Communication with Friends and Family: Once a week     Frequency of Social Gatherings with Friends and Family: Twice a week     Attends Buddhism Services: 1 to 4 times per year     Active Member of Clubs or Organizations: Yes     Attends Club or Organization Meetings: 1 to 4 times per year     Marital Status: Never    Housing Stability: Unknown (6/20/2023)    Housing Stability Vital Sign     Unable to Pay for Housing in the Last Year: No     Unstable Housing in the Last Year: No        Objective:     Vitals:    06/23/23 1041   BP: (!) 180/82   BP Location: Right arm   Patient Position: Sitting   Pulse: (!) 55   Temp: 36.5 °C (97.7 °F)   TempSrc: Temporal   SpO2: 97%   Weight: 89.4 kg (197 lb)   Height: 1.803 m (5' 11\")     Body mass index is 27.48 kg/m².     Physical Exam  Gen:  AO, NAD  Cardiac:  RRR, no " murmur  Respiratory:  Lungs CTAB, no wheeze  Left thumb - ulnar side of nail with some dried blood without scabbing/erythema  Right wrist - ganglion cyst overlying right radial artery    Assessment and Plan:   David Houston is a 62 y.o. male with a Annual Exam (Lab results review)     The following was discussed with the patient today.    Problem List Items Addressed This Visit       Essential hypertension     Acute on chronic, worsening  Has historically been on 10 mg Lisinopril in the past.  Was wondering about trying Ramipril  Ramipril 2.5 mg qday for 90 days with 3 refills sent - will adjust as needed         Relevant Medications    ramipril (ALTACE) 2.5 MG Cap    Ganglion cyst of wrist, right     Chronic, stable  Does overlie the radial artery  Denies any other symptoms  Will defer to Dr. Adams         Injury of left thumbnail     New problem to provider  Self-limiting issue, improving         Healthcare maintenance     Lipid profile WNL  CMP WNL  PSA unremarkable  HTN - BP as per essential HTN section (recheck in clinic showed was 178/88         Grief     New problem to provider  Normal grief response at this point - will monitor            Followup: Return in about 6 weeks (around 8/4/2023).    Lokesh Agrawal M.D.    Patient seen and discussed with Dr. Milena MD.    Please note that this dictation was created using voice recognition software. I have made every reasonable attempt to correct obvious errors, but I expect that there are errors of grammar and possibly content that I did not discover before finalizing the note.

## 2023-07-18 ENCOUNTER — APPOINTMENT (OUTPATIENT)
Dept: ADMISSIONS | Facility: MEDICAL CENTER | Age: 63
End: 2023-07-18
Attending: ORTHOPAEDIC SURGERY
Payer: COMMERCIAL

## 2023-08-23 ENCOUNTER — OFFICE VISIT (OUTPATIENT)
Dept: MEDICAL GROUP | Facility: OTHER | Age: 63
End: 2023-08-23
Payer: COMMERCIAL

## 2023-08-23 VITALS
DIASTOLIC BLOOD PRESSURE: 82 MMHG | HEIGHT: 71 IN | OXYGEN SATURATION: 95 % | SYSTOLIC BLOOD PRESSURE: 138 MMHG | RESPIRATION RATE: 16 BRPM | WEIGHT: 195 LBS | BODY MASS INDEX: 27.3 KG/M2 | TEMPERATURE: 97.4 F | HEART RATE: 63 BPM

## 2023-08-23 DIAGNOSIS — E78.5 DYSLIPIDEMIA: ICD-10-CM

## 2023-08-23 DIAGNOSIS — I10 ESSENTIAL HYPERTENSION: ICD-10-CM

## 2023-08-23 PROCEDURE — 3075F SYST BP GE 130 - 139MM HG: CPT | Performed by: STUDENT IN AN ORGANIZED HEALTH CARE EDUCATION/TRAINING PROGRAM

## 2023-08-23 PROCEDURE — 3079F DIAST BP 80-89 MM HG: CPT | Performed by: STUDENT IN AN ORGANIZED HEALTH CARE EDUCATION/TRAINING PROGRAM

## 2023-08-23 PROCEDURE — 99213 OFFICE O/P EST LOW 20 MIN: CPT | Mod: GC | Performed by: STUDENT IN AN ORGANIZED HEALTH CARE EDUCATION/TRAINING PROGRAM

## 2023-08-23 RX ORDER — BIOTIN 5 MG
TABLET ORAL DAILY
COMMUNITY
Start: 2023-02-01

## 2023-08-23 ASSESSMENT — FIBROSIS 4 INDEX: FIB4 SCORE: 1.65

## 2023-08-23 NOTE — ASSESSMENT & PLAN NOTE
Blood pressure in 120s-130s/70s with initiation of ramipril.  No medication side effects or adverse reactions. Continue at current dose.

## 2023-08-23 NOTE — PROGRESS NOTES
Subjective:   After discussion of the patient with the fellow, I personally saw and examined the patient and agree with the fellows assessment and plan              David Houston is a 62 y.o. male here for the evaluation and management of Follow-Up    HPI  Here for blood pressure follow up. Was recently started on ramipril for blood pressures in the 140s systolic. Reports blood pressure ranges 120s-130s/70s at home since starting medication. Denies any side effects related to medication including cough or lower extremity edema. Labs reviewed with patient, all of which are goal.     ROS  As per HPI     Current Outpatient Medications   Medication Sig Dispense Refill    Krill Oil 1000 MG Cap       ramipril (ALTACE) 2.5 MG Cap Take 1 Capsule by mouth every day. 90 Capsule 3    atorvastatin (LIPITOR) 40 MG Tab TAKE 1 TABLET DAILY 100 Tablet 4    fluticasone (FLONASE) 50 MCG/ACT nasal spray USE 1 TO 2 SPRAYS IN EACH NOSTRIL EVERY DAY 48 g 3    MAGNESIUM MALATE PO Take 400 mg by mouth.      LYCOPENE PO Take 2 Capsules by mouth every day.      VITAMIN D, CHOLECALCIFEROL, PO Take 2,000 Int'l Units by mouth every day.      TURMERIC PO Take 2,600 mg by mouth every day.      Coenzyme Q10 (CO Q 10) 100 MG Cap Take 100 mg by mouth every day. Uviquonol      Misc Natural Products (TART WILKERSON ADVANCED) Cap Take 1-2 Caps by mouth every day.      Glucos-Chond-Hyal Ac-Ca Fructo (MOVE FREE [employer] [facility] ADVANCE) Tab Take 1 Tab by mouth every day.       No current facility-administered medications for this visit.     Allergies  Patient has no known allergies.    Past Medical History:   Diagnosis Date    Fall     Hypercholesterolemia     Hypertension      Patient Active Problem List    Diagnosis Date Noted    Injury of left thumbnail 06/23/2023    Healthcare maintenance 06/23/2023    Grief 06/23/2023    Contusion of hand 12/16/2022    Ganglion cyst of wrist, right 12/16/2022    Left ear pain 08/02/2019    Left wrist pain 10/18/2018     Skin lesion 2018    Ataxia 03/15/2018    Cavernous angioma 03/15/2018    Essential hypertension 2018    Dyslipidemia 2018    Allergic rhinitis 2018    Encounter to establish care 2018    Abnormal MRI 2018    Tinnitus of left ear 2018       Past Surgical History  Past Surgical History:   Procedure Laterality Date    HARDWARE REMOVAL ORTHO  2011    Performed by ROSALINE BENITEZ at SURGERY [facility] ORS    ORIF, ANKLE  2010    Performed by ROSALINE BENITEZ at SURGERY [facility] ORS    OTHER ABDOMINAL SURGERY  1970     double hernia repair       Social History     Socioeconomic History    Marital status: Single     Spouse name: Not on file    Number of children: Not on file    Years of education: Not on file    Highest education level: Associate degree: occupational, technical, or vocational program   Occupational History    Not on file   Tobacco Use    Smoking status: Former     Current packs/day: 0.00     Average packs/day: 0.5 packs/day for 15.0 years (7.5 ttl pk-yrs)     Types: Cigarettes     Start date: 3/15/1979     Quit date: 3/15/1994     Years since quittin.4    Smokeless tobacco: Never   Vaping Use    Vaping Use: Never used   Substance and Sexual Activity    Alcohol use: Yes     Comment: rarely/one per month    Drug use: No     Comment: 3 drinks a mo if that    Sexual activity: Not on file   Other Topics Concern    Not on file   Social History Narrative    Not on file     Social Determinants of Health     Financial Resource Strain: Low Risk  (2023)    Overall Financial Resource Strain (CARDIA)     Difficulty of Paying Living Expenses: Not hard at all   Food Insecurity: No Food Insecurity (2023)    Hunger Vital Sign     Worried About Running Out of Food in the Last Year: Never true     Ran Out of Food in the Last Year: Never true   Transportation Needs: No Transportation Needs (2023)    PRAPARE - Transportation     Lack of  "Transportation (Medical): No     Lack of Transportation (Non-Medical): No   Physical Activity: Inactive (6/20/2023)    Exercise Vital Sign     Days of Exercise per Week: 0 days     Minutes of Exercise per Session: 10 min   Stress: No Stress Concern Present (6/20/2023)    [employer] [place] of Occupational Health - Occupational Stress Questionnaire     Feeling of Stress : Only a little   Social Connections: Moderately Integrated (6/20/2023)    Social Connection and Isolation Panel [NHANES]     Frequency of Communication with Friends and Family: Once a week     Frequency of Social Gatherings with Friends and Family: Twice a week     Attends Pentecostalism Services: 1 to 4 times per year     Active Member of Clubs or Organizations: Yes     Attends Club or Organization Meetings: 1 to 4 times per year     Marital Status: Never    Intimate Partner Violence: Not on file   Housing Stability: Unknown (6/20/2023)    Housing Stability Vital Sign     Unable to Pay for Housing in the Last Year: No     Number of Places Lived in the Last Year: Not on file     Unstable Housing in the Last Year: No        Objective:     Vitals:    08/23/23 0943   BP: 138/82   BP Location: Right arm   Patient Position: Sitting   BP Cuff Size: Adult   Pulse: 63   Resp: 16   Temp: 36.3 °C (97.4 °F)   TempSrc: Temporal   SpO2: 95%   Weight: 88.5 kg (195 lb)   Height: 1.803 m (5' 11\")     Body mass index is 27.2 kg/m².     Physical Exam  General: Well-appearing, no acute distress  Neck: No carotid bruit  Cardiac: Regular rate and rhythm, no murmurs gallops or rubs  Lungs: CTAB  Abdomen: Normal bowel sounds, no bruit present  Legs: No edema      Assessment and Plan:   David Houston is a 62 y.o. male with a Follow-Up     The following was discussed with the patient today.    1. Essential hypertension    2. Dyslipidemia    Other orders  - Krill Oil 1000 MG Cap    Problem List Items Addressed This Visit       Essential hypertension     Blood pressure " in 120s-130s/70s with initiation of ramipril.  No medication side effects or adverse reactions. Continue at current dose.          Dyslipidemia     Lipid panel reviewed, which is at goal.  Continue with atorvastatin.  Discussed with the patient that aspirin is no longer recommended for primary prevention.  Given that his blood pressure and labs are at goal, that he is also taking Krill oil, shared decision make with patient and will plan to discontinue aspirin today.          Patient seen with Dr. Milena Gabriel, DO  [facility] Fellow

## 2023-08-23 NOTE — ASSESSMENT & PLAN NOTE
Lipid panel reviewed, which is at goal.  Continue with atorvastatin.  Discussed with the patient that aspirin is no longer recommended for primary prevention.  Given that his blood pressure and labs are at goal, that he is also taking Krill oil, shared decision make with patient and will plan to discontinue aspirin today.

## 2023-08-28 ENCOUNTER — PRE-ADMISSION TESTING (OUTPATIENT)
Dept: ADMISSIONS | Facility: MEDICAL CENTER | Age: 63
End: 2023-08-28
Attending: ORTHOPAEDIC SURGERY
Payer: COMMERCIAL

## 2023-08-28 VITALS — HEIGHT: 71 IN | BODY MASS INDEX: 27.2 KG/M2

## 2023-08-28 DIAGNOSIS — Z01.812 PRE-OPERATIVE LABORATORY EXAMINATION: ICD-10-CM

## 2023-08-28 DIAGNOSIS — R27.0 ATAXIA: ICD-10-CM

## 2023-08-28 DIAGNOSIS — R26.89 IMBALANCE: Primary | ICD-10-CM

## 2023-08-28 NOTE — PREPROCEDURE INSTRUCTIONS
Pre-admit telephone appointment completed. Reviewed the Preparing for your procedure handout with patient over the phone. Patient instructed per pharmacy guidelines regarding taking or holding regularly prescribed medications before surgery. Instructed to take the following medications the day of surgery with a sip of water per pharmacy medication guidelines: Atorvastatin.    Denies anesthesia complications

## 2023-08-29 ENCOUNTER — PRE-ADMISSION TESTING (OUTPATIENT)
Dept: ADMISSIONS | Facility: MEDICAL CENTER | Age: 63
End: 2023-08-29
Attending: ORTHOPAEDIC SURGERY
Payer: COMMERCIAL

## 2023-08-29 DIAGNOSIS — Z01.812 PRE-OPERATIVE LABORATORY EXAMINATION: ICD-10-CM

## 2023-08-29 LAB
ANION GAP SERPL CALC-SCNC: 10 MMOL/L (ref 7–16)
BUN SERPL-MCNC: 13 MG/DL (ref 8–22)
CALCIUM SERPL-MCNC: 9.3 MG/DL (ref 8.4–10.2)
CHLORIDE SERPL-SCNC: 105 MMOL/L (ref 96–112)
CO2 SERPL-SCNC: 27 MMOL/L (ref 20–33)
CREAT SERPL-MCNC: 0.77 MG/DL (ref 0.5–1.4)
EKG IMPRESSION: NORMAL
GFR SERPLBLD CREATININE-BSD FMLA CKD-EPI: 102 ML/MIN/1.73 M 2
GLUCOSE SERPL-MCNC: 92 MG/DL (ref 65–99)
POTASSIUM SERPL-SCNC: 4.1 MMOL/L (ref 3.6–5.5)
SODIUM SERPL-SCNC: 142 MMOL/L (ref 135–145)

## 2023-08-29 PROCEDURE — 93005 ELECTROCARDIOGRAM TRACING: CPT

## 2023-08-29 PROCEDURE — 93010 ELECTROCARDIOGRAM REPORT: CPT | Performed by: INTERNAL MEDICINE

## 2023-08-29 PROCEDURE — 36415 COLL VENOUS BLD VENIPUNCTURE: CPT

## 2023-08-29 PROCEDURE — 80048 BASIC METABOLIC PNL TOTAL CA: CPT

## 2023-09-05 DIAGNOSIS — R27.0 ATAXIA: ICD-10-CM

## 2023-09-13 ENCOUNTER — ANESTHESIA EVENT (OUTPATIENT)
Dept: SURGERY | Facility: MEDICAL CENTER | Age: 63
End: 2023-09-13
Payer: COMMERCIAL

## 2023-09-13 ENCOUNTER — HOSPITAL ENCOUNTER (OUTPATIENT)
Facility: MEDICAL CENTER | Age: 63
End: 2023-09-13
Attending: ORTHOPAEDIC SURGERY | Admitting: ORTHOPAEDIC SURGERY
Payer: COMMERCIAL

## 2023-09-13 ENCOUNTER — ANESTHESIA (OUTPATIENT)
Dept: SURGERY | Facility: MEDICAL CENTER | Age: 63
End: 2023-09-13
Payer: COMMERCIAL

## 2023-09-13 VITALS
HEART RATE: 65 BPM | RESPIRATION RATE: 16 BRPM | OXYGEN SATURATION: 94 % | DIASTOLIC BLOOD PRESSURE: 89 MMHG | WEIGHT: 198.52 LBS | SYSTOLIC BLOOD PRESSURE: 175 MMHG | BODY MASS INDEX: 27.69 KG/M2 | TEMPERATURE: 97.9 F

## 2023-09-13 DIAGNOSIS — M67.431 GANGLION CYST OF WRIST, RIGHT: ICD-10-CM

## 2023-09-13 PROCEDURE — 160048 HCHG OR STATISTICAL LEVEL 1-5: Performed by: ORTHOPAEDIC SURGERY

## 2023-09-13 PROCEDURE — 160009 HCHG ANES TIME/MIN: Performed by: ORTHOPAEDIC SURGERY

## 2023-09-13 PROCEDURE — 160046 HCHG PACU - 1ST 60 MINS PHASE II: Performed by: ORTHOPAEDIC SURGERY

## 2023-09-13 PROCEDURE — 700111 HCHG RX REV CODE 636 W/ 250 OVERRIDE (IP): Mod: JZ | Performed by: STUDENT IN AN ORGANIZED HEALTH CARE EDUCATION/TRAINING PROGRAM

## 2023-09-13 PROCEDURE — 88304 TISSUE EXAM BY PATHOLOGIST: CPT

## 2023-09-13 PROCEDURE — 160025 RECOVERY II MINUTES (STATS): Performed by: ORTHOPAEDIC SURGERY

## 2023-09-13 PROCEDURE — 160028 HCHG SURGERY MINUTES - 1ST 30 MINS LEVEL 3: Performed by: ORTHOPAEDIC SURGERY

## 2023-09-13 PROCEDURE — 160002 HCHG RECOVERY MINUTES (STAT): Performed by: ORTHOPAEDIC SURGERY

## 2023-09-13 PROCEDURE — 700105 HCHG RX REV CODE 258: Performed by: ORTHOPAEDIC SURGERY

## 2023-09-13 PROCEDURE — 160039 HCHG SURGERY MINUTES - EA ADDL 1 MIN LEVEL 3: Performed by: ORTHOPAEDIC SURGERY

## 2023-09-13 PROCEDURE — 700101 HCHG RX REV CODE 250: Performed by: STUDENT IN AN ORGANIZED HEALTH CARE EDUCATION/TRAINING PROGRAM

## 2023-09-13 PROCEDURE — 160035 HCHG PACU - 1ST 60 MINS PHASE I: Performed by: ORTHOPAEDIC SURGERY

## 2023-09-13 PROCEDURE — 700101 HCHG RX REV CODE 250: Performed by: ORTHOPAEDIC SURGERY

## 2023-09-13 RX ORDER — OXYCODONE HCL 5 MG/5 ML
10 SOLUTION, ORAL ORAL
Status: DISCONTINUED | OUTPATIENT
Start: 2023-09-13 | End: 2023-09-13 | Stop reason: HOSPADM

## 2023-09-13 RX ORDER — CEFAZOLIN SODIUM 1 G/3ML
INJECTION, POWDER, FOR SOLUTION INTRAMUSCULAR; INTRAVENOUS PRN
Status: DISCONTINUED | OUTPATIENT
Start: 2023-09-13 | End: 2023-09-13 | Stop reason: SURG

## 2023-09-13 RX ORDER — HYDRALAZINE HYDROCHLORIDE 20 MG/ML
5 INJECTION INTRAMUSCULAR; INTRAVENOUS
Status: DISCONTINUED | OUTPATIENT
Start: 2023-09-13 | End: 2023-09-13 | Stop reason: HOSPADM

## 2023-09-13 RX ORDER — DIPHENHYDRAMINE HYDROCHLORIDE 50 MG/ML
12.5 INJECTION INTRAMUSCULAR; INTRAVENOUS
Status: DISCONTINUED | OUTPATIENT
Start: 2023-09-13 | End: 2023-09-13 | Stop reason: HOSPADM

## 2023-09-13 RX ORDER — OXYCODONE HCL 5 MG/5 ML
5 SOLUTION, ORAL ORAL
Status: DISCONTINUED | OUTPATIENT
Start: 2023-09-13 | End: 2023-09-13 | Stop reason: HOSPADM

## 2023-09-13 RX ORDER — ONDANSETRON 2 MG/ML
4 INJECTION INTRAMUSCULAR; INTRAVENOUS
Status: DISCONTINUED | OUTPATIENT
Start: 2023-09-13 | End: 2023-09-13 | Stop reason: HOSPADM

## 2023-09-13 RX ORDER — HYDROMORPHONE HYDROCHLORIDE 1 MG/ML
0.1 INJECTION, SOLUTION INTRAMUSCULAR; INTRAVENOUS; SUBCUTANEOUS
Status: DISCONTINUED | OUTPATIENT
Start: 2023-09-13 | End: 2023-09-13 | Stop reason: HOSPADM

## 2023-09-13 RX ORDER — HYDROMORPHONE HYDROCHLORIDE 1 MG/ML
0.4 INJECTION, SOLUTION INTRAMUSCULAR; INTRAVENOUS; SUBCUTANEOUS
Status: DISCONTINUED | OUTPATIENT
Start: 2023-09-13 | End: 2023-09-13 | Stop reason: HOSPADM

## 2023-09-13 RX ORDER — HALOPERIDOL 5 MG/ML
1 INJECTION INTRAMUSCULAR
Status: DISCONTINUED | OUTPATIENT
Start: 2023-09-13 | End: 2023-09-13 | Stop reason: HOSPADM

## 2023-09-13 RX ORDER — LIDOCAINE HYDROCHLORIDE 20 MG/ML
INJECTION, SOLUTION EPIDURAL; INFILTRATION; INTRACAUDAL; PERINEURAL PRN
Status: DISCONTINUED | OUTPATIENT
Start: 2023-09-13 | End: 2023-09-13 | Stop reason: SURG

## 2023-09-13 RX ORDER — EPHEDRINE SULFATE 50 MG/ML
5 INJECTION, SOLUTION INTRAVENOUS
Status: DISCONTINUED | OUTPATIENT
Start: 2023-09-13 | End: 2023-09-13 | Stop reason: HOSPADM

## 2023-09-13 RX ORDER — SODIUM CHLORIDE, SODIUM LACTATE, POTASSIUM CHLORIDE, CALCIUM CHLORIDE 600; 310; 30; 20 MG/100ML; MG/100ML; MG/100ML; MG/100ML
INJECTION, SOLUTION INTRAVENOUS CONTINUOUS
Status: DISCONTINUED | OUTPATIENT
Start: 2023-09-13 | End: 2023-09-13 | Stop reason: HOSPADM

## 2023-09-13 RX ORDER — LABETALOL HYDROCHLORIDE 5 MG/ML
5 INJECTION, SOLUTION INTRAVENOUS
Status: DISCONTINUED | OUTPATIENT
Start: 2023-09-13 | End: 2023-09-13 | Stop reason: HOSPADM

## 2023-09-13 RX ORDER — BUPIVACAINE HYDROCHLORIDE AND EPINEPHRINE 5; 5 MG/ML; UG/ML
INJECTION, SOLUTION EPIDURAL; INTRACAUDAL; PERINEURAL
Status: DISCONTINUED | OUTPATIENT
Start: 2023-09-13 | End: 2023-09-13 | Stop reason: HOSPADM

## 2023-09-13 RX ORDER — MEPERIDINE HYDROCHLORIDE 25 MG/ML
12.5 INJECTION INTRAMUSCULAR; INTRAVENOUS; SUBCUTANEOUS
Status: DISCONTINUED | OUTPATIENT
Start: 2023-09-13 | End: 2023-09-13 | Stop reason: HOSPADM

## 2023-09-13 RX ORDER — HYDROMORPHONE HYDROCHLORIDE 1 MG/ML
0.2 INJECTION, SOLUTION INTRAMUSCULAR; INTRAVENOUS; SUBCUTANEOUS
Status: DISCONTINUED | OUTPATIENT
Start: 2023-09-13 | End: 2023-09-13 | Stop reason: HOSPADM

## 2023-09-13 RX ADMIN — PROPOFOL 50 MG: 10 INJECTION, EMULSION INTRAVENOUS at 11:57

## 2023-09-13 RX ADMIN — SODIUM CHLORIDE, POTASSIUM CHLORIDE, SODIUM LACTATE AND CALCIUM CHLORIDE: 600; 310; 30; 20 INJECTION, SOLUTION INTRAVENOUS at 11:42

## 2023-09-13 RX ADMIN — PROPOFOL 50 MG: 10 INJECTION, EMULSION INTRAVENOUS at 12:04

## 2023-09-13 RX ADMIN — CEFAZOLIN 2 G: 1 INJECTION, POWDER, FOR SOLUTION INTRAMUSCULAR; INTRAVENOUS at 11:46

## 2023-09-13 RX ADMIN — LIDOCAINE HYDROCHLORIDE 100 MG: 20 INJECTION, SOLUTION EPIDURAL; INFILTRATION; INTRACAUDAL at 11:45

## 2023-09-13 RX ADMIN — PROPOFOL 50 MG: 10 INJECTION, EMULSION INTRAVENOUS at 11:53

## 2023-09-13 RX ADMIN — PROPOFOL 50 MG: 10 INJECTION, EMULSION INTRAVENOUS at 12:10

## 2023-09-13 RX ADMIN — PROPOFOL 50 MG: 10 INJECTION, EMULSION INTRAVENOUS at 11:45

## 2023-09-13 RX ADMIN — PROPOFOL 50 MG: 10 INJECTION, EMULSION INTRAVENOUS at 12:00

## 2023-09-13 RX ADMIN — PROPOFOL 50 MG: 10 INJECTION, EMULSION INTRAVENOUS at 11:50

## 2023-09-13 ASSESSMENT — PAIN DESCRIPTION - PAIN TYPE
TYPE: CHRONIC PAIN
TYPE: SURGICAL PAIN

## 2023-09-13 ASSESSMENT — FIBROSIS 4 INDEX: FIB4 SCORE: 1.65

## 2023-09-13 NOTE — OR NURSING
1313- Patient arrived to phase 2. Patient changed out of surgical gown into clothes. Patient is A&Ox4. Patient reports no pain and no nausea. Vital signs taken and patient assessed. Asked the patient if they had to use the bathroom. The patient was assisted to the bathroom to void.    1320- Dressing saturated with blood after getting dressing. Reinforced the dressing with gauze and tegaderm    1325- IV removed and discharge instructions read. All questions answered.     1342- CNA transported the patient to ride via wheelchair. Discharged to care of responsible adult. All belongings with the patient

## 2023-09-13 NOTE — ANESTHESIA POSTPROCEDURE EVALUATION
Patient: David Houston    Procedure Summary     Date: 09/13/23 Room / Location:  OR 02 / SURGERY [facility]    Anesthesia Start: 1142 Anesthesia Stop: 1230    Procedure: RIGHT VOLAR WRIST GANGLION CYST EXCISION (Right: Wrist) Diagnosis: (GANGLION CYST RIGHT WRIST)    Surgeons: Art Adams M.D. Responsible Provider: Adrien Bean D.O.    Anesthesia Type: MAC ASA Status: 3          Final Anesthesia Type: MAC  Last vitals  BP   Blood Pressure: (!) 81/43    Temp   36 °C (96.8 °F)    Pulse   (!) 55   Resp   16    SpO2   100 %      Anesthesia Post Evaluation    Patient location during evaluation: PACU  Patient participation: complete - patient participated  Level of consciousness: awake and alert    Airway patency: patent  Anesthetic complications: no  Cardiovascular status: hemodynamically stable  Respiratory status: acceptable  Hydration status: euvolemic    PONV: none          No notable events documented.     Nurse Pain Score: 0 (NPRS)

## 2023-09-13 NOTE — ANESTHESIA PREPROCEDURE EVALUATION
Case: [id number] Anesthesia Start Date/Time: 09/13/23 1142    Procedure: RIGHT VOLAR WRIST GANGLION CYST EXCISION (Right: Wrist)    Anesthesia type: MAC    Pre-op diagnosis: GANGLION CYST RIGHT WRIST    Location: [facility] OR 02 / SURGERY [facility]    Surgeons: Art Adams M.D.          Relevant Problems   CARDIAC   (positive) Essential hypertension       Physical Exam    Airway   Mallampati: II  TM distance: >3 FB  Neck ROM: full       Cardiovascular - normal exam  Rhythm: regular  Rate: normal  (-) murmur     Dental - normal exam           Pulmonary - normal exam  Breath sounds clear to auscultation     Abdominal    Neurological - normal exam                 Anesthesia Plan    ASA 3   ASA physical status 3 criteria: hypertension - poorly controlled    Plan - MAC               Induction: intravenous    Postoperative Plan: Postoperative administration of opioids is intended.    Pertinent diagnostic labs and testing reviewed    Informed Consent:    Anesthetic plan and risks discussed with patient.    Use of blood products discussed with: patient whom consented to blood products.

## 2023-09-13 NOTE — OP REPORT
Date of surgery: 9/13/2023    Preoperative diagnosis: Right volar wrist ganglion cyst    Postoperative diagnosis: Same    Surgery performed: Right volar wrist ganglion cyst excision    Surgeon: Art Adams MD    Complications: None    Estimated blood loss: 5 mL    Tourniquet time: 13 minutes    Tourniquet pressure: 250 mmHg    Specimens: Right volar wrist ganglion cyst    Indication for procedure: Mr. Houston is a pleasant gentleman who has had a persistent right volar wrist ganglion cyst.  This has continued to enlarge over time.  There has been episodes of pain as well as episodes where it becomes bothersome due to the size and nature.  For these reasons the decision was made to taken the operating today for the above-mentioned procedure.    Description of procedure: On the date of surgery Mr. Houston was seen in the preoperative area where informed consent was obtained with all risks and benefits of the procedure explained and all questions answered.  He wished to proceed with the surgery.  The proper site was marked.  He was subsequently taken to the operating room and placed in the supine position with all bony promises well-padded.  General endotracheal anesthesia was induced.  A tourniquet was placed on the right upper extremity was then prepped and draped in the usual sterile fashion.    A timeout was performed with all persons in attendance agreed on the proper patient, proper surgical site and proper surgery be performed.    An Esmarch was used to exsanguinate the right upper extremity and the tourniquet was insufflated to 250 mmHg.  I then made a chevron style incision directly over the mucous cyst.  Sharp and blunt dissection was taken down through subcutaneous tissues.  Bleeding was well controlled using Bovie electrocautery.  I then able to continue to bluntly dissect around the mucous cyst which was readily visible beneath the dermal layer.  I was able to visualize the radial artery which was  coursing adjacent to the mucous cyst.  I was able to bluntly dissected this free from the mucous cyst.  I extended the incision proximally with 1 additional limb of a Cristal style incision.  This helped to aid in visualization and to protect the radial artery.  I then was able to more easily bluntly dissect around the proximal aspect of the cyst.  I was then able to free this from the underlying wrist capsule.  This was then passed off to the back table for pathologic evaluation.    The wound was then thoroughly irrigated with copious amounts of normal saline.  Gelfoam was placed into the wound to help aid in scar tissue formation and prevent reoccurrence.  The wound was then closed using 4 nylon in a horizontal mattress fashion.  Was then dressed with Xeroform, 4 x 4's and a Tegaderm dressing.  The tourniquet was deflated.  General endotracheal anesthesia was reversed.  He was taken to the PACU in good and stable condition.    Disposition: He will be discharged home on a regular diet.  He will have a 10 pound lifting restriction the right upper extremity.  He should apply ice and elevate as much possible for the next 72 hours.  He will return to clinic in 10 to 14 days for repeat evaluation.  At that time he will not have no further lifting restrictions.

## 2023-09-13 NOTE — DISCHARGE INSTRUCTIONS
If any questions arise, call your provider.  If your provider is not available, please feel free to call the Surgical Center at [phone].    MEDICATIONS: Resume taking daily medication.  Take prescribed pain medication with food.  If no medication is prescribed, you may take non-aspirin pain medication if needed.  PAIN MEDICATION CAN BE VERY CONSTIPATING.  Take a stool softener or laxative such as senokot, pericolace, or milk of magnesia if needed.    Last pain medication given at     What to Expect Post Anesthesia    Rest and take it easy for the first 24 hours.  A responsible adult is recommended to remain with you during that time.  It is normal to feel sleepy.  We encourage you to not do anything that requires balance, judgment or coordination.    FOR 24 HOURS DO NOT:  Drive, operate machinery or run household appliances.  Drink beer or alcoholic beverages.  Make important decisions or sign legal documents.    To avoid nausea, slowly advance diet as tolerated, avoiding spicy or greasy foods for the first day.  Add more substantial food to your diet according to your provider's instructions.  Babies can be fed formula or breast milk as soon as they are hungry.  INCREASE FLUIDS AND FIBER TO AVOID CONSTIPATION.    MILD FLU-LIKE SYMPTOMS ARE NORMAL.  YOU MAY EXPERIENCE GENERALIZED MUSCLE ACHES, THROAT IRRITATION, HEADACHE AND/OR SOME NAUSEA.    Dr. Adams's Home Instructions:  -Keep dressings clean, dry and intact   -No lifting anything heavier than 10 lbs with the operative hand   -May remove dressings after 48 hrs   -May begin showering and washing hands as normal after 48 hrs   -Do not soak the wound   -Keep hand elevated and apply ice as much as possible in the first three days   -Do not operate a vehicle or machinery while taking narcotic pain medications   -Return to clinic in 10-14 days   -Please call the office with any questions [phone]

## 2023-09-13 NOTE — ANESTHESIA TIME REPORT
Anesthesia Start and Stop Event Times     Date Time Event    9/13/2023 1135 Ready for Procedure     1142 Anesthesia Start     1230 Anesthesia Stop        Responsible Staff  09/13/23    Name Role Begin End    Adrien Bean D.O. Anesth 1142 1230        Overtime Reason:  no overtime (within assigned shift)    Comments:

## 2023-09-13 NOTE — OR NURSING
1219: [facility]  R wrist dressing CDI; radial pulse +2. Cold pack applied to R wrist.     Sedation/Resp Status: Non responsive to verbal with OPA in place. Respirations spontaneous and non-labored.    1230: Pt continues to sleep with OPA in place. R wrist dressing CDI.     1233: OPA removed. Pt slowly awaking. Denies pain and discomfort.     1245: Pt sipping on water and tolerating well. Denies pain.      1300: No change in surgical site assessment. Pt sipping on juice, denies pain. Brother updated of pt status.     1310: Meets criteria for stage 2. Report to BOBBI Ordonez

## 2023-09-15 LAB — PATHOLOGY CONSULT NOTE: NORMAL

## 2023-11-06 DIAGNOSIS — J30.1 SEASONAL ALLERGIC RHINITIS DUE TO POLLEN: ICD-10-CM

## 2023-11-07 RX ORDER — FLUTICASONE PROPIONATE 50 MCG
SPRAY, SUSPENSION (ML) NASAL
Qty: 48 G | Refills: 3 | Status: SHIPPED | OUTPATIENT
Start: 2023-11-07

## 2024-02-21 ENCOUNTER — APPOINTMENT (OUTPATIENT)
Dept: MEDICAL GROUP | Facility: OTHER | Age: 64
End: 2024-02-21
Payer: COMMERCIAL

## 2024-02-29 DIAGNOSIS — Z00.00 HEALTHCARE MAINTENANCE: ICD-10-CM

## 2024-02-29 DIAGNOSIS — E78.5 DYSLIPIDEMIA: ICD-10-CM

## 2024-02-29 DIAGNOSIS — I10 ESSENTIAL HYPERTENSION: ICD-10-CM

## 2024-03-06 ENCOUNTER — TELEPHONE (OUTPATIENT)
Dept: MEDICAL GROUP | Facility: OTHER | Age: 64
End: 2024-03-06

## 2024-03-06 NOTE — TELEPHONE ENCOUNTER
[facility] called stating they had faxed in a request to refill  atorvastatin (LIPITOR) 40 MG Tab [[id number]]    They would like to see if this can be authorized as the patient is out of the medication.

## 2024-03-06 NOTE — TELEPHONE ENCOUNTER
Received request via: Pharmacy    Was the patient seen in the last year in this department? Yes    Does the patient have an active prescription (recently filled or refills available) for medication(s) requested? No    Pharmacy Name: [facility]

## 2024-03-08 ENCOUNTER — TELEPHONE (OUTPATIENT)
Dept: MEDICAL GROUP | Facility: CLINIC | Age: 64
End: 2024-03-08
Payer: COMMERCIAL

## 2024-03-08 RX ORDER — ATORVASTATIN CALCIUM 40 MG/1
TABLET, FILM COATED ORAL
Qty: 100 TABLET | Refills: 4 | Status: SHIPPED | OUTPATIENT
Start: 2024-03-08 | End: 2024-03-12 | Stop reason: SDUPTHER

## 2024-03-12 RX ORDER — ATORVASTATIN CALCIUM 40 MG/1
TABLET, FILM COATED ORAL
Qty: 100 TABLET | Refills: 4 | Status: SHIPPED | OUTPATIENT
Start: 2024-03-12

## 2024-03-12 NOTE — TELEPHONE ENCOUNTER
Received request via: Pharmacy    Was the patient seen in the last year in this department? Yes    Does the patient have an active prescription (recently filled or refills available) for medication(s) requested? No    [facility]

## 2024-04-24 ENCOUNTER — PATIENT MESSAGE (OUTPATIENT)
Dept: MEDICAL GROUP | Facility: OTHER | Age: 64
End: 2024-04-24
Payer: COMMERCIAL

## 2024-04-24 RX ORDER — RAMIPRIL 2.5 MG/1
2.5 CAPSULE ORAL DAILY
Qty: 90 CAPSULE | Refills: 3 | Status: SHIPPED | OUTPATIENT
Start: 2024-04-24 | End: 2024-04-24 | Stop reason: SDUPTHER

## 2024-04-25 RX ORDER — RAMIPRIL 2.5 MG/1
2.5 CAPSULE ORAL DAILY
Qty: 90 CAPSULE | Refills: 3 | Status: SHIPPED | OUTPATIENT
Start: 2024-04-25

## 2024-05-21 ENCOUNTER — HOSPITAL ENCOUNTER (OUTPATIENT)
Dept: LAB | Facility: MEDICAL CENTER | Age: 64
End: 2024-05-21
Attending: FAMILY MEDICINE
Payer: COMMERCIAL

## 2024-05-21 DIAGNOSIS — E78.5 DYSLIPIDEMIA: ICD-10-CM

## 2024-05-21 DIAGNOSIS — Z00.00 HEALTHCARE MAINTENANCE: ICD-10-CM

## 2024-05-21 DIAGNOSIS — I10 ESSENTIAL HYPERTENSION: ICD-10-CM

## 2024-05-21 LAB
ALBUMIN SERPL BCP-MCNC: 4.5 G/DL (ref 3.2–4.9)
ALBUMIN/GLOB SERPL: 1.8 G/DL
ALP SERPL-CCNC: 91 U/L (ref 30–99)
ALT SERPL-CCNC: 27 U/L (ref 2–50)
ANION GAP SERPL CALC-SCNC: 10 MMOL/L (ref 7–16)
AST SERPL-CCNC: 24 U/L (ref 12–45)
BILIRUB SERPL-MCNC: 1.1 MG/DL (ref 0.1–1.5)
BUN SERPL-MCNC: 9 MG/DL (ref 8–22)
CALCIUM ALBUM COR SERPL-MCNC: 8.5 MG/DL (ref 8.5–10.5)
CALCIUM SERPL-MCNC: 8.9 MG/DL (ref 8.5–10.5)
CHLORIDE SERPL-SCNC: 104 MMOL/L (ref 96–112)
CHOLEST SERPL-MCNC: 162 MG/DL (ref 100–199)
CO2 SERPL-SCNC: 26 MMOL/L (ref 20–33)
CREAT SERPL-MCNC: 0.53 MG/DL (ref 0.5–1.4)
ERYTHROCYTE [DISTWIDTH] IN BLOOD BY AUTOMATED COUNT: 43 FL (ref 35.9–50)
EST. AVERAGE GLUCOSE BLD GHB EST-MCNC: 108 MG/DL
FASTING STATUS PATIENT QL REPORTED: NORMAL
GFR SERPLBLD CREATININE-BSD FMLA CKD-EPI: 114 ML/MIN/1.73 M 2
GLOBULIN SER CALC-MCNC: 2.5 G/DL (ref 1.9–3.5)
GLUCOSE SERPL-MCNC: 96 MG/DL (ref 65–99)
HBA1C MFR BLD: 5.4 % (ref 4–5.6)
HCT VFR BLD AUTO: 45.8 % (ref 42–52)
HDLC SERPL-MCNC: 50 MG/DL
HGB BLD-MCNC: 15.8 G/DL (ref 14–18)
LDLC SERPL CALC-MCNC: 94 MG/DL
MCH RBC QN AUTO: 30.9 PG (ref 27–33)
MCHC RBC AUTO-ENTMCNC: 34.5 G/DL (ref 32.3–36.5)
MCV RBC AUTO: 89.6 FL (ref 81.4–97.8)
PLATELET # BLD AUTO: 175 K/UL (ref 164–446)
PMV BLD AUTO: 10.7 FL (ref 9–12.9)
POTASSIUM SERPL-SCNC: 4 MMOL/L (ref 3.6–5.5)
PROT SERPL-MCNC: 7 G/DL (ref 6–8.2)
RBC # BLD AUTO: 5.11 M/UL (ref 4.7–6.1)
SODIUM SERPL-SCNC: 140 MMOL/L (ref 135–145)
TRIGL SERPL-MCNC: 92 MG/DL (ref 0–149)
WBC # BLD AUTO: 5.8 K/UL (ref 4.8–10.8)

## 2024-06-14 ENCOUNTER — OFFICE VISIT (OUTPATIENT)
Dept: MEDICAL GROUP | Facility: OTHER | Age: 64
End: 2024-06-14
Payer: COMMERCIAL

## 2024-06-14 VITALS
OXYGEN SATURATION: 98 % | RESPIRATION RATE: 18 BRPM | DIASTOLIC BLOOD PRESSURE: 80 MMHG | HEART RATE: 62 BPM | TEMPERATURE: 97.2 F | SYSTOLIC BLOOD PRESSURE: 150 MMHG | WEIGHT: 190 LBS | BODY MASS INDEX: 26.6 KG/M2 | HEIGHT: 71 IN

## 2024-06-14 DIAGNOSIS — Z13.9 SCREENING DUE: ICD-10-CM

## 2024-06-14 DIAGNOSIS — Z00.00 HEALTHCARE MAINTENANCE: ICD-10-CM

## 2024-06-14 PROCEDURE — 3077F SYST BP >= 140 MM HG: CPT | Performed by: STUDENT IN AN ORGANIZED HEALTH CARE EDUCATION/TRAINING PROGRAM

## 2024-06-14 PROCEDURE — 99396 PREV VISIT EST AGE 40-64: CPT | Performed by: STUDENT IN AN ORGANIZED HEALTH CARE EDUCATION/TRAINING PROGRAM

## 2024-06-14 PROCEDURE — 3079F DIAST BP 80-89 MM HG: CPT | Performed by: STUDENT IN AN ORGANIZED HEALTH CARE EDUCATION/TRAINING PROGRAM

## 2024-06-14 ASSESSMENT — FIBROSIS 4 INDEX: FIB4 SCORE: 1.61

## 2024-06-14 ASSESSMENT — PATIENT HEALTH QUESTIONNAIRE - PHQ9: CLINICAL INTERPRETATION OF PHQ2 SCORE: 0

## 2024-06-14 NOTE — PROGRESS NOTES
Subjective:   David Houston is a 63 y.o. male here for the evaluation and management of Annual Exam (Wellness exam )    HPI  Pleasant 63-year-old gentleman here for annual exam.    Complaints: No acute complaints today.    PMHx:   Reviewed with patient, has hypertension and hyperlipidemia.  BP mildly elevated 140/80 here, but reports blood pressure at home is usually in the 120s over 70s.  No side effects with low-dose ramipril.  Recently had lab work completed, cholesterol is at goal has been on atorvastatin for quite some time without any issues.  A1c 5.4.  CMP and CBC also within normal limits.    SocHx:  Former smoker, approximately 15 years of smoking, quit over 30 years ago- due for AAA screening once 67   Alcohol- 3 standard alcoholic drinks or less per week  Substances- none  Exercise- walks approximately 1-3 miles per week  Diet- somewhat high in processed and refined foods.   Employment- Retired      #HCM  Hx of polyps on colonoscopy, supposed to get colonoscopy every 5 years, due for repeat colonoscopy.   HLD- labs reviewed, at goal on statin  DM2- A1c 5.4  AAA- 15 year smoking history, due for screening age 67  Lung Cancer- 15 year history, quit 30 years ago, screening not indicated.   Prostate- [facility]. PSA 0.9 1 year ago, will repeat in December 2024.     ROS  Negative except as mentioned in HPI     Current Outpatient Medications   Medication Sig Dispense Refill    Turmeric 1053 MG Tab Take  by mouth.      ramipril (ALTACE) 2.5 MG Cap Take 1 Capsule by mouth every day. 90 Capsule 3    atorvastatin (LIPITOR) 40 MG Tab TAKE 1 TABLET DAILY 100 Tablet 4    fluticasone (FLONASE) 50 MCG/ACT nasal spray USE 1 TO 2 SPRAYS IN EACH NOSTRIL EVERY DAY 48 g 3    Krill Oil 1000 MG Cap every day.      MAGNESIUM MALATE PO Take 400 mg by mouth.      LYCOPENE PO Take 2 Capsules by mouth every day.      VITAMIN D, CHOLECALCIFEROL, PO Take 2,000 Int'l Units by mouth every day.      Coenzyme  Q10 (CO Q 10) 100 MG Cap Take 100 mg by mouth every day. Uviquonol       No current facility-administered medications for this visit.     Allergies  Patient has no known allergies.    Past Medical History:   Diagnosis Date    Fall     High cholesterol     Hypercholesterolemia     Hypertension      Patient Active Problem List    Diagnosis Date Noted    Injury of left thumbnail 2023    Healthcare maintenance 2023    Grief 2023    Contusion of hand 2022    Ganglion cyst of wrist, right 2022    Left ear pain 2019    Left wrist pain 10/18/2018    Skin lesion 2018    Ataxia 03/15/2018    Cavernous angioma 03/15/2018    Essential hypertension 2018    Dyslipidemia 2018    Allergic rhinitis 2018    Encounter to establish care 2018    Abnormal MRI 2018    Tinnitus of left ear 2018       Past Surgical History  Past Surgical History:   Procedure Laterality Date    GANGLION EXCISION Right 2023    Procedure: RIGHT VOLAR WRIST GANGLION CYST EXCISION;  Surgeon: Art Adams M.D.;  Location: SURGERY [facility];  Service: Orthopedics    HARDWARE REMOVAL ORTHO  2011    Performed by ROSALINE BENITEZ at SURGERY [facility] ORS    ORIF, ANKLE  2010    Performed by ROSALINE BENITEZ at SURGERY [facility] ORS    OTHER ABDOMINAL SURGERY  1970     double hernia repair       Social History     Socioeconomic History    Marital status: Single     Spouse name: Not on file    Number of children: Not on file    Years of education: Not on file    Highest education level: Associate degree: occupational, technical, or vocational program   Occupational History    Not on file   Tobacco Use    Smoking status: Former     Current packs/day: 0.00     Average packs/day: 0.5 packs/day for 15.0 years (7.5 ttl pk-yrs)     Types: Cigarettes     Start date: 3/15/1979     Quit date: 3/15/1994     Years since quittin.2    Smokeless tobacco: Never   Vaping  Use    Vaping status: Never Used   Substance and Sexual Activity    Alcohol use: Yes     Alcohol/week: 1.8 oz     Types: 3 Cans of beer per week    Drug use: No    Sexual activity: Not on file   Other Topics Concern    Not on file   Social History Narrative    Not on file     Social Determinants of Health     Financial Resource Strain: Low Risk  (6/20/2023)    Overall Financial Resource Strain (CARDIA)     Difficulty of Paying Living Expenses: Not hard at all   Food Insecurity: No Food Insecurity (6/20/2023)    Hunger Vital Sign     Worried About Running Out of Food in the Last Year: Never true     Ran Out of Food in the Last Year: Never true   Transportation Needs: No Transportation Needs (6/20/2023)    PRAPARE - Transportation     Lack of Transportation (Medical): No     Lack of Transportation (Non-Medical): No   Physical Activity: Inactive (6/20/2023)    Exercise Vital Sign     Days of Exercise per Week: 0 days     Minutes of Exercise per Session: 10 min   Stress: No Stress Concern Present (6/20/2023)    [place] citizen [place] of Occupational Health - Occupational Stress Questionnaire     Feeling of Stress : Only a little   Social Connections: Moderately Integrated (6/20/2023)    Social Connection and Isolation Panel [NHANES]     Frequency of Communication with Friends and Family: Once a week     Frequency of Social Gatherings with Friends and Family: Twice a week     Attends Mandaen Services: 1 to 4 times per year     Active Member of Clubs or Organizations: Yes     Attends Club or Organization Meetings: 1 to 4 times per year     Marital Status: Never    Intimate Partner Violence: Not on file   Housing Stability: Unknown (6/20/2023)    Housing Stability Vital Sign     Unable to Pay for Housing in the Last Year: No     Number of Places Lived in the Last Year: Not on file     Unstable Housing in the Last Year: No        Objective:     Vitals:    06/14/24 1006   BP: (!) 150/80   BP Location: Right arm   Patient  "Position: Sitting   BP Cuff Size: Adult   Pulse: 62   Resp: 18   Temp: 36.2 °C (97.2 °F)   TempSrc: Temporal   SpO2: 98%   Weight: 86.2 kg (190 lb)   Height: 1.803 m (5' 11\")     Body mass index is 26.5 kg/m².     Physical Exam  General: Well-appearing, no acute distress  HEENT: TMs clear bilaterally, nares patent bilaterally, posterior oropharynx clear.  Dentition in good repair.  No cervical lymphadenopathy.  No thyromegaly.  Cardiac: Regular rate and rhythm, no murmurs no gallops or rubs.  No carotid bruit.  Lungs: CTAB  Abdomen: No tenderness on palpation.  Bowel sounds normal.  No abdominal bruit.  Extremities: Moves all extremities spontaneously.  No lower extremity edema present.    Assessment and Plan:   David Houston is a 63 y.o. male with a Annual Exam (Wellness exam )     The following was discussed with the patient today.    1. Screening due  - Referral to Gastroenterology    2. Healthcare maintenance    Other orders  - Turmeric 1053 MG Tab; Take  by mouth.    Problem List Items Addressed This Visit          Family Medicine Problems    Healthcare maintenance     Here for annual wellness visit. No concerns today. Reviewed labs, at goal. BP mildly elevated today, will have patient monitor blood pressure at home, follow up if persistently elevated >140/90. Given BP 120s/70s reported at home, no dose change to ramipril today. Discussed supplement use, advise cessation of krill oil given recent studies. Due for colonoscopy, referral has been sent. Counseled on dietary, exercise, and fall prevention recommendations. Advised to obtain flu and covid shots when appropriate in the fall. Follow up in 1 year or sooner as needed.           Other Visit Diagnoses       Screening due        Relevant Orders    Referral to Gastroenterology          Papa Gabriel DO  [employer] [facility] Fellow       "

## 2024-06-14 NOTE — ASSESSMENT & PLAN NOTE
Here for annual wellness visit. No concerns today. Reviewed labs, at goal. BP mildly elevated today, will have patient monitor blood pressure at home, follow up if persistently elevated >140/90. Given BP 120s/70s reported at home, no dose change to ramipril today. Discussed supplement use, advise cessation of krill oil given recent studies. Due for colonoscopy, referral has been sent. Counseled on dietary, exercise, and fall prevention recommendations. Advised to obtain flu and covid shots when appropriate in the fall. Follow up in 1 year or sooner as needed.

## 2024-11-22 ENCOUNTER — PATIENT MESSAGE (OUTPATIENT)
Dept: MEDICAL GROUP | Facility: CLINIC | Age: 64
End: 2024-11-22
Payer: COMMERCIAL

## 2024-11-22 DIAGNOSIS — J30.1 SEASONAL ALLERGIC RHINITIS DUE TO POLLEN: ICD-10-CM

## 2024-11-25 RX ORDER — FLUTICASONE PROPIONATE 50 MCG
SPRAY, SUSPENSION (ML) NASAL
Qty: 48 G | Refills: 3 | Status: SHIPPED | OUTPATIENT
Start: 2024-11-25

## 2025-01-28 ENCOUNTER — OFFICE VISIT (OUTPATIENT)
Dept: MEDICAL GROUP | Facility: OTHER | Age: 65
End: 2025-01-28
Payer: COMMERCIAL

## 2025-01-28 VITALS
SYSTOLIC BLOOD PRESSURE: 138 MMHG | BODY MASS INDEX: 27.16 KG/M2 | TEMPERATURE: 98.8 F | DIASTOLIC BLOOD PRESSURE: 78 MMHG | OXYGEN SATURATION: 98 % | WEIGHT: 194 LBS | HEART RATE: 60 BPM | HEIGHT: 71 IN

## 2025-01-28 DIAGNOSIS — Z72.0 TOBACCO USE: ICD-10-CM

## 2025-01-28 DIAGNOSIS — L98.9 SKIN LESION: ICD-10-CM

## 2025-01-28 DIAGNOSIS — E78.5 DYSLIPIDEMIA: ICD-10-CM

## 2025-01-28 DIAGNOSIS — I10 ESSENTIAL HYPERTENSION: ICD-10-CM

## 2025-01-28 DIAGNOSIS — Z72.3 LACK OF PHYSICAL ACTIVITY: ICD-10-CM

## 2025-01-28 PROCEDURE — 3078F DIAST BP <80 MM HG: CPT | Performed by: FAMILY MEDICINE

## 2025-01-28 PROCEDURE — 99213 OFFICE O/P EST LOW 20 MIN: CPT | Performed by: FAMILY MEDICINE

## 2025-01-28 PROCEDURE — 3075F SYST BP GE 130 - 139MM HG: CPT | Performed by: FAMILY MEDICINE

## 2025-01-28 RX ORDER — RAMIPRIL 5 MG/1
5 CAPSULE ORAL DAILY
Qty: 90 CAPSULE | Refills: 1 | Status: SHIPPED | OUTPATIENT
Start: 2025-01-28 | End: 2025-07-27

## 2025-01-28 ASSESSMENT — PATIENT HEALTH QUESTIONNAIRE - PHQ9: CLINICAL INTERPRETATION OF PHQ2 SCORE: 0

## 2025-01-28 ASSESSMENT — FIBROSIS 4 INDEX: FIB4 SCORE: 1.64

## 2025-02-04 ASSESSMENT — ENCOUNTER SYMPTOMS
GASTROINTESTINAL NEGATIVE: 1
PSYCHIATRIC NEGATIVE: 1
RESPIRATORY NEGATIVE: 1
NEUROLOGICAL NEGATIVE: 1
EYES NEGATIVE: 1
CONSTITUTIONAL NEGATIVE: 1
CARDIOVASCULAR NEGATIVE: 1

## 2025-02-04 NOTE — ASSESSMENT & PLAN NOTE
Y[place] refill his blood pressure medication recommend that he continue taking his blood pressure at home is needed every once a while and will follow him up in about 3 to 6 months.

## 2025-02-04 NOTE — PROGRESS NOTES
Subjective:   CC:   Chief Complaint   Patient presents with    Hypertension Follow-up     Elevated blood pressure       HPI: David Quezada is a 64 y.o. male who presents today for the following problems:    Problem   Skin Lesion    Patient comes in with a skin lesion on his hand.  Left side.  Appears on visual examination to be a seborrheic keratosis.  Patient states that it is in all places and gets caught sometimes on close or things has not seen it changing color or in size but that it does get dry and cracked okay.      Essential Hypertension    Patient is doing pretty well on his blood pressure medication and is in need of a refill.  States that he was taking 10 mg and is now on 5 home blood pressures are in the 120s 130s over 70s and 80s.  Today's blood pressure is 138/78.  Denies any chest pain shortness of breath.       Dyslipidemia    Patient with a history of dyslipidemia last cholesterol level was fantastic done last May.  Denies any chest pain shortness of breath.  Continues to take Lipitor 40 mg and Krill oil 1000 mg caps daily.         Current Outpatient Medications   Medication Sig Dispense Refill    ramipril (ALTACE) 5 MG Cap Take 1 Capsule by mouth every day for 180 days. 90 Capsule 1    fluticasone (FLONASE) 50 MCG/ACT nasal spray USE 1 TO 2 SPRAYS IN EACH NOSTRIL EVERY DAY 48 g 3    Turmeric 1053 MG Tab Take  by mouth.      atorvastatin (LIPITOR) 40 MG Tab TAKE 1 TABLET DAILY 100 Tablet 4    Krill Oil 1000 MG Cap every day.      MAGNESIUM MALATE PO Take 400 mg by mouth.      LYCOPENE PO Take 2 Capsules by mouth every day.      VITAMIN D, CHOLECALCIFEROL, PO Take 2,000 Int'l Units by mouth every day.      Coenzyme Q10 (CO Q 10) 100 MG Cap Take 100 mg by mouth every day. Uviquonol       No current facility-administered medications for this visit.       Social History     Tobacco Use    Smoking status: Former     Current packs/day: 0.00     Average packs/day: 0.5 packs/day for 15.0 years (7.5 ttl  "pk-yrs)     Types: Cigarettes     Start date: 3/15/1979     Quit date: 3/15/1994     Years since quittin.9    Smokeless tobacco: Never   Vaping Use    Vaping status: Never Used   Substance Use Topics    Alcohol use: Yes     Alcohol/week: 1.8 oz     Types: 3 Cans of beer per week    Drug use: No       Review of Systems   Constitutional: Negative.    HENT: Negative.     Eyes: Negative.    Respiratory: Negative.     Cardiovascular: Negative.    Gastrointestinal: Negative.    Skin: Negative.    Neurological: Negative.    Psychiatric/Behavioral: Negative.           Objective:     Vitals:    25 0957   BP: 138/78   BP Location: Right arm   Patient Position: Sitting   Pulse: 60   Temp: 37.1 °C (98.8 °F)   SpO2: 98%   Weight: 88 kg (194 lb)   Height: 1.803 m (5' 11\")     Body mass index is 27.06 kg/m².     Physical Exam  Vitals reviewed.   Constitutional:       Appearance: Normal appearance.   HENT:      Head: Normocephalic and atraumatic.      Right Ear: Tympanic membrane normal.      Left Ear: Tympanic membrane normal.   Cardiovascular:      Rate and Rhythm: Normal rate and regular rhythm.      Pulses: Normal pulses.      Heart sounds: Normal heart sounds. No murmur heard.     No friction rub. No gallop.   Pulmonary:      Effort: Pulmonary effort is normal. No respiratory distress.      Breath sounds: Normal breath sounds. No stridor. No wheezing, rhonchi or rales.   Neurological:      Mental Status: He is alert.          Assessment & Plan:   Skin lesion  Patient underwent cryotherapy today to remove the SK.  Patient tolerated very well we will have him follow-up in a couple weeks to check the skin lesion.    Essential hypertension  [place] refill his blood pressure medication recommend that he continue taking his blood pressure at home is needed every once a while and will follow him up in about 3 to 6 months.    Dyslipidemia  Patient doing very well recommend that he continue taking his medications we will " going recheck his cholesterol in May and we will follow-up afterwards if there are any issues.      Followup: Return in about 4 months (around 5/28/2025), or if symptoms worsen or fail to improve.    Deyvi Abbott M.D.    Please note that this dictation was created using voice recognition software. I have made every reasonable attempt to correct obvious errors, but I expect that there are errors of grammar and possibly content that I did not discover before finalizing the note.

## 2025-02-04 NOTE — ASSESSMENT & PLAN NOTE
Patient doing very well recommend that he continue taking his medications we will going recheck his cholesterol in May and we will follow-up afterwards if there are any issues.

## 2025-02-04 NOTE — ASSESSMENT & PLAN NOTE
Patient underwent cryotherapy today to remove the SK.  Patient tolerated very well we will have him follow-up in a couple weeks to check the skin lesion.

## 2025-06-10 DIAGNOSIS — J30.1 SEASONAL ALLERGIC RHINITIS DUE TO POLLEN: ICD-10-CM

## 2025-06-10 DIAGNOSIS — I10 ESSENTIAL HYPERTENSION: Primary | ICD-10-CM

## 2025-06-10 DIAGNOSIS — E78.5 DYSLIPIDEMIA: ICD-10-CM

## 2025-06-25 ENCOUNTER — HOSPITAL ENCOUNTER (OUTPATIENT)
Dept: LAB | Facility: MEDICAL CENTER | Age: 65
End: 2025-06-25
Attending: FAMILY MEDICINE
Payer: COMMERCIAL

## 2025-06-25 DIAGNOSIS — E78.5 DYSLIPIDEMIA: ICD-10-CM

## 2025-06-25 DIAGNOSIS — I10 ESSENTIAL HYPERTENSION: ICD-10-CM

## 2025-06-25 DIAGNOSIS — J30.1 SEASONAL ALLERGIC RHINITIS DUE TO POLLEN: ICD-10-CM

## 2025-06-25 LAB
ALBUMIN SERPL BCP-MCNC: 4.4 G/DL (ref 3.2–4.9)
ALBUMIN/GLOB SERPL: 1.8 G/DL
ALP SERPL-CCNC: 78 U/L (ref 30–99)
ALT SERPL-CCNC: 22 U/L (ref 2–50)
ANION GAP SERPL CALC-SCNC: 12 MMOL/L (ref 7–16)
AST SERPL-CCNC: 21 U/L (ref 12–45)
BILIRUB SERPL-MCNC: 1 MG/DL (ref 0.1–1.5)
BUN SERPL-MCNC: 9 MG/DL (ref 8–22)
CALCIUM ALBUM COR SERPL-MCNC: 8.7 MG/DL (ref 8.5–10.5)
CALCIUM SERPL-MCNC: 9 MG/DL (ref 8.5–10.5)
CHLORIDE SERPL-SCNC: 105 MMOL/L (ref 96–112)
CHOLEST SERPL-MCNC: 145 MG/DL (ref 100–199)
CO2 SERPL-SCNC: 25 MMOL/L (ref 20–33)
CREAT SERPL-MCNC: 0.76 MG/DL (ref 0.5–1.4)
ERYTHROCYTE [DISTWIDTH] IN BLOOD BY AUTOMATED COUNT: 45 FL (ref 35.9–50)
FASTING STATUS PATIENT QL REPORTED: NORMAL
GFR SERPLBLD CREATININE-BSD FMLA CKD-EPI: 101 ML/MIN/1.73 M 2
GLOBULIN SER CALC-MCNC: 2.4 G/DL (ref 1.9–3.5)
GLUCOSE SERPL-MCNC: 85 MG/DL (ref 65–99)
HCT VFR BLD AUTO: 46.3 % (ref 42–52)
HDLC SERPL-MCNC: 54 MG/DL
HGB BLD-MCNC: 15.2 G/DL (ref 14–18)
LDLC SERPL CALC-MCNC: 75 MG/DL
MCH RBC QN AUTO: 30.4 PG (ref 27–33)
MCHC RBC AUTO-ENTMCNC: 32.8 G/DL (ref 32.3–36.5)
MCV RBC AUTO: 92.6 FL (ref 81.4–97.8)
PLATELET # BLD AUTO: 157 K/UL (ref 164–446)
PMV BLD AUTO: 11.5 FL (ref 9–12.9)
POTASSIUM SERPL-SCNC: 3.9 MMOL/L (ref 3.6–5.5)
PROT SERPL-MCNC: 6.8 G/DL (ref 6–8.2)
PSA SERPL DL<=0.01 NG/ML-MCNC: 1.72 NG/ML (ref 0–4)
RBC # BLD AUTO: 5 M/UL (ref 4.7–6.1)
SODIUM SERPL-SCNC: 142 MMOL/L (ref 135–145)
TRIGL SERPL-MCNC: 79 MG/DL (ref 0–149)
WBC # BLD AUTO: 5.3 K/UL (ref 4.8–10.8)

## 2025-06-25 PROCEDURE — 85027 COMPLETE CBC AUTOMATED: CPT

## 2025-06-25 PROCEDURE — 84153 ASSAY OF PSA TOTAL: CPT

## 2025-06-25 PROCEDURE — 80053 COMPREHEN METABOLIC PANEL: CPT

## 2025-06-25 PROCEDURE — 36415 COLL VENOUS BLD VENIPUNCTURE: CPT

## 2025-06-25 PROCEDURE — 80061 LIPID PANEL: CPT

## 2025-07-02 ENCOUNTER — APPOINTMENT (OUTPATIENT)
Dept: MEDICAL GROUP | Facility: OTHER | Age: 65
End: 2025-07-02
Payer: COMMERCIAL

## 2025-07-02 VITALS
SYSTOLIC BLOOD PRESSURE: 144 MMHG | TEMPERATURE: 98.3 F | DIASTOLIC BLOOD PRESSURE: 76 MMHG | HEIGHT: 71 IN | WEIGHT: 190 LBS | HEART RATE: 58 BPM | BODY MASS INDEX: 26.6 KG/M2 | OXYGEN SATURATION: 98 %

## 2025-07-02 DIAGNOSIS — I10 ESSENTIAL HYPERTENSION: Primary | ICD-10-CM

## 2025-07-02 DIAGNOSIS — Z00.00 HEALTHCARE MAINTENANCE: ICD-10-CM

## 2025-07-02 DIAGNOSIS — E78.5 DYSLIPIDEMIA: ICD-10-CM

## 2025-07-02 PROCEDURE — 3078F DIAST BP <80 MM HG: CPT | Performed by: FAMILY MEDICINE

## 2025-07-02 PROCEDURE — 99214 OFFICE O/P EST MOD 30 MIN: CPT | Performed by: FAMILY MEDICINE

## 2025-07-02 PROCEDURE — 3077F SYST BP >= 140 MM HG: CPT | Performed by: FAMILY MEDICINE

## 2025-07-02 RX ORDER — RAMIPRIL 5 MG/1
5 CAPSULE ORAL DAILY
Qty: 90 CAPSULE | Refills: 2 | Status: SHIPPED | OUTPATIENT
Start: 2025-07-02 | End: 2026-03-29

## 2025-07-02 RX ORDER — ATORVASTATIN CALCIUM 40 MG/1
TABLET, FILM COATED ORAL
Qty: 100 TABLET | Refills: 4 | Status: SHIPPED | OUTPATIENT
Start: 2025-07-02

## 2025-07-02 RX ORDER — ACETYLCYSTEINE 600 MG
CAPSULE ORAL
COMMUNITY

## 2025-07-02 ASSESSMENT — FIBROSIS 4 INDEX: FIB4 SCORE: 1.77

## 2025-07-07 ASSESSMENT — ENCOUNTER SYMPTOMS
RESPIRATORY NEGATIVE: 1
EYES NEGATIVE: 1
PSYCHIATRIC NEGATIVE: 1
GASTROINTESTINAL NEGATIVE: 1
CONSTITUTIONAL NEGATIVE: 1
NEUROLOGICAL NEGATIVE: 1
CARDIOVASCULAR NEGATIVE: 1

## 2025-07-07 NOTE — PROGRESS NOTES
Subjective:   CC:   Chief Complaint   Patient presents with    Annual Exam     High blood pressure concerns  Refill atorvastatin lamipril       HPI: David Quezada is a 64 y.o. male who presents today for the following problems:    Problem   Healthcare Maintenance    Discussed his labs and today was very happy with him as he should be.  He is up-to-date on his colon cancer screening  I believe that he has had his zoster series but we will go ahead and check that.  He is planning to get his flu and COVID shots at the end of August of this year.  And we think that he may have had his pneumococcal vaccination we will research that and come back to it.     Essential Hypertension    Warren comes in to see me today for myriad of reasons.  First is going to be his blood pressure.  States that he is doing pretty well denies any chest pain shortness of breath or headaches especially upon arising in the mornings.  Is compliant with his medication ramipril 5 mg daily.  States he needs a refill on it.  This morning his blood pressure was a little bit elevated 144/76 but upon recheck at the end of the encounter his blood pressure was 135/74.  Labs from his most recent drawl last week show that his kidney function is fantastic       Dyslipidemia    Warren comes in to also check his lipid panel.  Drawn 1 week ago his lipid panel shows that his cholesterol is well-controlled he currently is taking Krill oil 1000 mg daily and is also on atorvastatin 40 mg.  He denies any chest pain shortness of breath muscle pain muscle weakness.         Current Medications[1]    Social History[2]    Review of Systems   Constitutional: Negative.    HENT: Negative.     Eyes: Negative.    Respiratory: Negative.     Cardiovascular: Negative.    Gastrointestinal: Negative.    Skin: Negative.    Neurological: Negative.    Psychiatric/Behavioral: Negative.           Objective:     Vitals:    07/02/25 0821   BP: (!) 144/76   BP Location: Right arm   Patient  "Position: Sitting   Pulse: (!) 58   Temp: 36.8 °C (98.3 °F)   SpO2: 98%   Weight: 86.2 kg (190 lb)   Height: 1.803 m (5' 11\")     Body mass index is 26.5 kg/m².     Physical Exam  Vitals reviewed.   Constitutional:       Appearance: Normal appearance.   HENT:      Head: Normocephalic.      Nose: Nose normal.      Mouth/Throat:      Mouth: Mucous membranes are moist.   Eyes:      Extraocular Movements: Extraocular movements intact.      Conjunctiva/sclera: Conjunctivae normal.      Pupils: Pupils are equal, round, and reactive to light.   Cardiovascular:      Rate and Rhythm: Normal rate and regular rhythm.      Pulses: Normal pulses.      Heart sounds: Normal heart sounds. No murmur heard.     No friction rub. No gallop.   Abdominal:      General: Abdomen is flat. Bowel sounds are normal.      Palpations: Abdomen is soft.   Musculoskeletal:         General: Normal range of motion.   Skin:     General: Skin is warm.   Neurological:      General: No focal deficit present.      Mental Status: He is alert and oriented to person, place, and time. Mental status is at baseline.   Psychiatric:         Mood and Affect: Mood normal.         Thought Content: Thought content normal.          Assessment & Plan:   Essential hypertension  I recommend the day of continue taking the ramipril as prescribed I think his blood pressure is well-controlled and I will follow-up with him in 6 months or so just for a quick recheck.    Dyslipidemia  I am very happy with Warren's lipid panel he is doing a great job of lowering his cholesterol.  Recommended we continue on atorvastatin 40 and his krill oils.    Healthcare maintenance  There is annual exam completed.  He looks great.  Will follow him up again in about 6 to 8 months    Followup: Return in about 6 months (around 1/2/2026), or if symptoms worsen or fail to improve.    Deyvi Abbott M.D.    Please note that this dictation was created using voice recognition software. I have made " every reasonable attempt to correct obvious errors, but I expect that there are errors of grammar and possibly content that I did not discover before finalizing the note.       [1]   Current Outpatient Medications   Medication Sig Dispense Refill    Misc Natural Products (LIVER PROTECT) Cap Take  by mouth.      ramipril (ALTACE) 5 MG Cap Take 1 Capsule by mouth every day for 270 days. 90 Capsule 2    atorvastatin (LIPITOR) 40 MG Tab TAKE 1 TABLET DAILY 100 Tablet 4    fluticasone (FLONASE) 50 MCG/ACT nasal spray USE 1 TO 2 SPRAYS IN EACH NOSTRIL EVERY DAY 48 g 3    Turmeric 1053 MG Tab Take  by mouth.      Krill Oil 1000 MG Cap every day. (Patient taking differently: 500 Capsules every day. Every other day)      MAGNESIUM MALATE PO Take 400 mg by mouth. (Patient taking differently: Take 400 mg by mouth. Every other day)      LYCOPENE PO Take 2 Capsules by mouth every day.      VITAMIN D, CHOLECALCIFEROL, PO Take 2,000 Int'l Units by mouth every day.      Coenzyme Q10 (CO Q 10) 100 MG Cap Take 100 mg by mouth every day. Uviquonol       No current facility-administered medications for this visit.   [2]   Social History  Tobacco Use    Smoking status: Former     Current packs/day: 0.00     Average packs/day: 0.5 packs/day for 15.0 years (7.5 ttl pk-yrs)     Types: Cigarettes     Start date: 3/15/1979     Quit date: 3/15/1994     Years since quittin.3    Smokeless tobacco: Never   Vaping Use    Vaping status: Never Used   Substance Use Topics    Alcohol use: Yes     Alcohol/week: 1.8 oz     Types: 3 Cans of beer per week     Comment: 3 beers per week    Drug use: No

## 2025-07-07 NOTE — ASSESSMENT & PLAN NOTE
I recommend the day of continue taking the ramipril as prescribed I think his blood pressure is well-controlled and I will follow-up with him in 6 months or so just for a quick recheck.

## 2025-07-07 NOTE — ASSESSMENT & PLAN NOTE
I am very happy with Warren's lipid panel he is doing a great job of lowering his cholesterol.  Recommended we continue on atorvastatin 40 and his krill oils.

## (undated) DEVICE — GLOVE BIOGEL INDICATOR SZ 8 SURGICAL PF LTX - (50/BX 4BX/CA)

## (undated) DEVICE — WRAP CO-FLEX 4IN X 5YD STERIL - SELF-ADHERENT (18/CA)

## (undated) DEVICE — ELECTRODE DUAL RETURN W/ CORD - (50/PK)

## (undated) DEVICE — SODIUM CHL IRRIGATION 0.9% 1000ML (12EA/CA)

## (undated) DEVICE — CHLORAPREP 26 ML APPLICATOR - ORANGE TINT(25/CA)

## (undated) DEVICE — PACK UPPER EXTREMITY SM OR - (3/CA)

## (undated) DEVICE — DRESSING TRANSPARENT FILM TEGADERM 2.375 X 2.75"  (100EA/BX)"

## (undated) DEVICE — STERI STRIP COMPOUND BENZOIN - TINCTURE 0.6ML WITH APPLICATOR (40EA/BX)

## (undated) DEVICE — GLOVE BIOGEL SZ 8 SURGICAL PF LTX - (50PR/BX 4BX/CA)

## (undated) DEVICE — BLADE BEAVER 6400 MINI EYE ROUND TIP SHARP ON ONE SIDE (20/CA)

## (undated) DEVICE — COVER LIGHT HANDLE FLEXIBLE - SOFT (2EA/PK 80PK/CA)

## (undated) DEVICE — TOWEL STOP TIMEOUT SAFETY FLAG (40EA/CA)

## (undated) DEVICE — BANDAGE ELASTIC 2 IN X 5 YDS - (10EA/BX 5BX/CA)

## (undated) DEVICE — SUTURE 4-0 ETHILON PS-2 18 (12PK/BX)"

## (undated) DEVICE — LACTATED RINGERS INJ 1000 ML - (14EA/CA 60CA/PF)

## (undated) DEVICE — PAD PREP 24 X 48 CUFFED - (100/CA)

## (undated) DEVICE — SUTURE GENERAL